# Patient Record
Sex: FEMALE | Race: WHITE | NOT HISPANIC OR LATINO | Employment: OTHER | URBAN - NONMETROPOLITAN AREA
[De-identification: names, ages, dates, MRNs, and addresses within clinical notes are randomized per-mention and may not be internally consistent; named-entity substitution may affect disease eponyms.]

---

## 2017-01-18 ENCOUNTER — OFFICE VISIT (OUTPATIENT)
Dept: CARDIOLOGY | Facility: CLINIC | Age: 82
End: 2017-01-18

## 2017-01-18 VITALS
HEART RATE: 76 BPM | SYSTOLIC BLOOD PRESSURE: 120 MMHG | BODY MASS INDEX: 29.55 KG/M2 | OXYGEN SATURATION: 92 % | WEIGHT: 177.38 LBS | HEIGHT: 65 IN | DIASTOLIC BLOOD PRESSURE: 60 MMHG

## 2017-01-18 DIAGNOSIS — R07.89 CHEST DISCOMFORT: ICD-10-CM

## 2017-01-18 DIAGNOSIS — I48.0 PAF (PAROXYSMAL ATRIAL FIBRILLATION) (HCC): Primary | ICD-10-CM

## 2017-01-18 DIAGNOSIS — I10 ESSENTIAL HYPERTENSION: ICD-10-CM

## 2017-01-18 DIAGNOSIS — E78.00 HYPERCHOLESTEROLEMIA: ICD-10-CM

## 2017-01-18 DIAGNOSIS — R06.02 SHORTNESS OF BREATH: ICD-10-CM

## 2017-01-18 DIAGNOSIS — I44.0 FIRST DEGREE AV BLOCK: ICD-10-CM

## 2017-01-18 DIAGNOSIS — Z79.899 LONG TERM CURRENT USE OF ANTIARRHYTHMIC MEDICAL THERAPY: ICD-10-CM

## 2017-01-18 DIAGNOSIS — R01.1 MURMUR, CARDIAC: ICD-10-CM

## 2017-01-18 DIAGNOSIS — R53.83 OTHER FATIGUE: ICD-10-CM

## 2017-01-18 DIAGNOSIS — Z79.01 CURRENT USE OF LONG TERM ANTICOAGULATION: ICD-10-CM

## 2017-01-18 PROCEDURE — 93000 ELECTROCARDIOGRAM COMPLETE: CPT | Performed by: NURSE PRACTITIONER

## 2017-01-18 PROCEDURE — 99214 OFFICE O/P EST MOD 30 MIN: CPT | Performed by: NURSE PRACTITIONER

## 2017-01-18 RX ORDER — LOSARTAN POTASSIUM 100 MG/1
100 TABLET ORAL DAILY
COMMUNITY
End: 2018-07-19

## 2017-01-18 RX ORDER — POTASSIUM CHLORIDE 20 MEQ/1
20 TABLET, EXTENDED RELEASE ORAL DAILY
COMMUNITY
End: 2018-11-19 | Stop reason: DRUGHIGH

## 2017-01-18 NOTE — MR AVS SNAPSHOT
Danay EMILIA Connolly   1/18/2017 1:00 PM   Office Visit    Dept Phone:  [phone]   Encounter #:  [id number]    Provider:  SANDRA Rabago   Department:  [facility] CARDIOLOGY                Your Full Care Plan              Today's Medication Changes          These changes are accurate as of: 1/18/17  1:47 PM.  If you have any questions, ask your nurse or doctor.               Medication(s)that have changed:     losartan 100 MG tablet   Commonly known as:  COZAAR   Take 100 mg by mouth Daily.   What changed:  Another medication with the same name was removed. Continue taking this medication, and follow the directions you see here.   Changed by:  SANDRA Rabago         Stop taking medication(s)listed here:     potassium chloride 10 MEQ CR tablet   Commonly known as:  K-DUR   Stopped by:  SANDRA Rabago                      Your Updated Medication List          This list is accurate as of: 1/18/17  1:47 PM.  Always use your most recent med list.                albuterol 108 (90 BASE) MCG/ACT inhaler   Commonly known as:  PROVENTIL HFA;VENTOLIN HFA       apixaban 5 MG tablet tablet   Commonly known as:  ELIQUIS       aspirin 81 MG EC tablet       CARTIA  MG 24 hr capsule   Generic drug:  diltiaZEM CD   TAKE 1 CAPSULE TWICE A DAY       cetirizine 10 MG tablet   Commonly known as:  zyrTEC       esomeprazole 40 MG capsule   Commonly known as:  nexIUM       fenofibrate 145 MG tablet   Commonly known as:  TRICOR       flecainide 50 MG tablet   Commonly known as:  TAMBOCOR       FLUoxetine 20 MG capsule   Commonly known as:  PROzac       furosemide 40 MG tablet   Commonly known as:  LASIX       losartan 100 MG tablet   Commonly known as:  COZAAR       meclizine 12.5 MG tablet   Commonly known as:  ANTIVERT       MULTI VITAMIN DAILY PO       OSTEO BI-FLEX ADV JOINT SHIELD tablet       polycarbophil 625 MG tablet   Commonly known as:  FIBERCON       potassium  "chloride 20 MEQ CR tablet   Commonly known as:  K-DUR,KLOR-CON       tiotropium 18 MCG per inhalation capsule   Commonly known as:  SPIRIVA       Vitamin B Complex tablet               We Performed the Following     ECG 12 Lead       You Were Diagnosed With        Codes Comments    PAF (paroxysmal atrial fibrillation)    -  Primary ICD-10-CM: I48.0  ICD-9-CM: 427.31     Essential hypertension     ICD-10-CM: I10  ICD-9-CM: 401.9     Hypercholesterolemia     ICD-10-CM: E78.00  ICD-9-CM: 272.0     First degree AV block     ICD-10-CM: I44.0  ICD-9-CM: 426.11     Chest discomfort     ICD-10-CM: R07.89  ICD-9-CM: 786.59     Shortness of breath     ICD-10-CM: R06.02  ICD-9-CM: 786.05     Other fatigue     ICD-10-CM: R53.83  ICD-9-CM: 780.79     Murmur, cardiac     ICD-10-CM: R01.1  ICD-9-CM: 785.2       Instructions     None    Patient Instructions History      Upcoming Appointments     Visit Type Date Time Department    FOLLOW UP 1/18/2017  1:00 PM [facility]    FOLLOW UP 7/24/2017 12:45 PM [facility]      MyChart Signup     Our records indicate that you have declined [facility] [employer] signup. If you would like to sign up for [employer], please email [email or web address] or call [phone] to obtain an activation code.             Other Info from Your Visit           Your Appointments     Jul 24, 2017 12:45 PM EDT   Follow Up with SANDRA Hudson   [facility] (--)    [address] [place] [address] [phone]           Arrive 15 minutes prior to appointment.              Allergies     No Known Allergies      Reason for Visit     Follow-up Rapid heart beat when \"feeling stressed\". PCP refills medications.      Vital Signs     Blood Pressure Pulse Height Weight Oxygen Saturation Body Mass Index    120/60 (BP Location: Left arm, Patient Position: Sitting, Cuff Size: Small Adult) 76 65\" (165.1 cm) 177 lb 6 oz (80.5 kg) 92% 29.52 kg/m2    Smoking " Status                   Never Smoker           Problems and Diagnoses Noted     First degree AV block    High cholesterol    High blood pressure    Atrial fibrillation (irregular heartbeat)    Chest discomfort        Shortness of breath        Malaise and fatigue        Murmur, cardiac

## 2017-01-18 NOTE — PROGRESS NOTES
"Chief Complaint   Patient presents with   • Follow-up     Rapid heart beat when \"feeling stressed\". PCP refills medications.       Subjective       Danay Connolly is a 82 y.o. female  with a history of palpitations and PAF. She has had a NERI and cardioversion for the PAF. She has been maintained on anticoagulation therapy and tambocor.  Today she returns to the office for a follow-up appointment.  Whenever feeling stress she does notice her heart develop palpitations in the form of racing.  She admits to increasing fatigue and shortness of breath with minimal exertion.  She states she stopped exercising and feels \"out of shape\".  At times she feels some chest discomfort but denies chest pain. Since stopping Nexium she mentions having more heart burn and takes Zantac as needed.     HPI         Cardiac History:    Past Surgical History   Procedure Laterality Date   • Breast cyst aspiration     • Mouth surgery     • Colonoscopy       wilth polypectomy   • Hysterectomy     • Echo - converted  08/27/1997     Echo- EF 50%. R/O Biscupid AV   • Converted (historical) holter  06/27/2011      Holter- (Dr. Garrett)  AVG HR 73 BPM. Mul Artifacts. Few PAC's   • Cardiovascular stress test  07/2011     Stress- 3 min, >100% THR. BP- 180/76. Negative   • Echo - converted  07/2011     Echo- EF 65%. Anterior WMA. RVSP- 53mmHg   • Converted (historical) holter  04/30/2014     Holter- (Dr. Garrett) A-Fib at AVG  BPM.   • Neri  05/14/2014     NERI and Cardioversion- No thrombus, converted to sinus with 200 J.       Current Outpatient Prescriptions   Medication Sig Dispense Refill   • apixaban (ELIQUIS) 5 MG tablet tablet Take 5 mg by mouth 2 (two) times a day.     • aspirin 81 MG EC tablet Take 81 mg by mouth daily.     • B Complex Vitamins (VITAMIN B COMPLEX) tablet Take 1 tablet by mouth daily.     • CARTIA  MG 24 hr capsule TAKE 1 CAPSULE TWICE A  capsule 1   • cetirizine (ZyrTEC) 10 MG tablet Take 10 mg by mouth daily. "     • fenofibrate (TRICOR) 145 MG tablet Take 145 mg by mouth every night.     • flecainide (TAMBOCOR) 50 MG tablet Take 50 mg by mouth every 12 (twelve) hours.     • FLUoxetine (PROzac) 20 MG capsule Take 20 mg by mouth every night.     • furosemide (LASIX) 40 MG tablet Take 20 mg by mouth every morning. (1/2 tab qam)     • losartan (COZAAR) 100 MG tablet Take 100 mg by mouth Daily.     • meclizine (ANTIVERT) 12.5 MG tablet Take 12.5 mg by mouth 3 (Three) Times a Day As Needed for dizziness (takes only when episode of dizziness occur).     • Misc Natural Products (OSTEO BI-FLEX ADV JOINT SHIELD) tablet Take 1 tablet by mouth daily.     • Multiple Vitamin (MULTI VITAMIN DAILY PO) Take 1 tablet by mouth daily.     • potassium chloride (K-DUR,KLOR-CON) 20 MEQ CR tablet Take 20 mEq by mouth Daily.     • tiotropium (SPIRIVA) 18 MCG per inhalation capsule Place 1 capsule into inhaler and inhale 1 (one) time daily.     • albuterol (PROVENTIL HFA;VENTOLIN HFA) 108 (90 BASE) MCG/ACT inhaler Inhale 2 puffs every 4 (four) hours as needed for wheezing.     • polycarbophil (FIBERCON) 625 MG tablet Take 625 mg by mouth daily.       No current facility-administered medications for this visit.        Review of patient's allergies indicates no known allergies.    Past Medical History   Diagnosis Date   • Arthritis    • Breast cyst      removed    • GERD (gastroesophageal reflux disease)    • H/O oral surgery    • H/O: hysterectomy    • Hx of colonoscopy with polypectomy    • Hypercholesterolemia    • Hyperlipidemia    • Hypertension    • Osteoporosis    • Seasonal allergies        Social History     Social History   • Marital status:      Spouse name: N/A   • Number of children: N/A   • Years of education: N/A     Occupational History   • Not on file.     Social History Main Topics   • Smoking status: Never Smoker   • Smokeless tobacco: Never Used   • Alcohol use No      Comment: Occasional    • Drug use: No   • Sexual  "activity: Not on file     Other Topics Concern   • Not on file     Social History Narrative       Family History   Problem Relation Age of Onset   • Stroke Mother    • Heart attack Father    • Hypertension Son        Review of Systems   Constitutional: Positive for fatigue. Negative for activity change, appetite change and fever.   HENT: Negative for congestion, nosebleeds, sinus pressure and trouble swallowing.    Respiratory: Positive for chest tightness and shortness of breath (worse with inclines and steps). Negative for choking and wheezing.    Cardiovascular: Positive for palpitations. Negative for chest pain and leg swelling.   Gastrointestinal: Negative for abdominal distention, abdominal pain, blood in stool, constipation, nausea (has had increase in heart burn) and vomiting.   Endocrine: Negative for polydipsia, polyphagia and polyuria.   Genitourinary: Negative for dysuria and hematuria.   Musculoskeletal: Negative for gait problem and myalgias.   Neurological: Positive for light-headedness. Negative for dizziness, syncope, speech difficulty, weakness and headaches.   Hematological: Bruises/bleeds easily.   Psychiatric/Behavioral: Negative for confusion and sleep disturbance. The patient is not nervous/anxious.      Diabetes- No  Thyroid-normal    Objective     Visit Vitals   • /60 (BP Location: Left arm, Patient Position: Sitting, Cuff Size: Small Adult)   • Pulse 76   • Ht 65\" (165.1 cm)   • Wt 177 lb 6 oz (80.5 kg)   • SpO2 92%   • BMI 29.52 kg/m2       Physical Exam   Constitutional: She is oriented to person, place, and time. Vital signs are normal. She appears well-developed.   Eyes: Pupils are equal, round, and reactive to light.   Neck: Neck supple. Carotid bruit is not present.   Cardiovascular: Normal rate, regular rhythm, S1 normal and normal pulses.    Murmur heard.   Systolic murmur is present with a grade of 2/6   Pulmonary/Chest: Effort normal and breath sounds normal.   Abdominal: " Soft. Bowel sounds are normal.   Musculoskeletal: She exhibits no edema.   Neurological: She is alert and oriented to person, place, and time.   Skin: Skin is warm and dry.   Psychiatric: She has a normal mood and affect. Her behavior is normal. Judgment and thought content normal.   Vitals reviewed.      ECG 12 Lead  Date/Time: 1/18/2017 1:29 PM  Performed by: GERHARD CANALES  Authorized by: GERHARD CANALES   Comparison: compared with previous ECG from 6/20/2016  Comparison to previous ECG: sinus  Rhythm: sinus rhythm  BPM: 75  Conduction: 1st degree  Comments:  ms, QTc 423 ms                Assessment/Plan      Danay was seen today for follow-up.    Diagnoses and all orders for this visit:    PAF (paroxysmal atrial fibrillation)  -     ECG 12 Lead  -     Stress Test With Myocardial Perfusion One Day; Future    Essential hypertension  -     Stress Test With Myocardial Perfusion One Day; Future  -     Adult Transthoracic Echo Complete; Future    Hypercholesterolemia  -     Stress Test With Myocardial Perfusion One Day; Future  -     Adult Transthoracic Echo Complete; Future    First degree AV block  -     Stress Test With Myocardial Perfusion One Day; Future  -     Adult Transthoracic Echo Complete; Future    Chest discomfort  -     Stress Test With Myocardial Perfusion One Day; Future  -     Adult Transthoracic Echo Complete; Future    Shortness of breath  -     Stress Test With Myocardial Perfusion One Day; Future  -     Adult Transthoracic Echo Complete; Future    Other fatigue  -     Stress Test With Myocardial Perfusion One Day; Future  -     Adult Transthoracic Echo Complete; Future    Murmur, cardiac  -     Stress Test With Myocardial Perfusion One Day; Future  -     Adult Transthoracic Echo Complete; Future    Long term current use of antiarrhythmic medical therapy  -     ECG 12 Lead    Current use of long term anticoagulation      It has been over 5 years since Ms. Connolly underwent cardiac workup.   Her symptoms could be anginal equivalents therefore a nuclear stress test and echocardiogram ordered.  She feels she is able to walk the treadmill but we will order as modified Bryce.  No medication changes recommended at this time. I advised her to take Zantac on regular basis for a couple of weeks to see if has improvement of heart burn symptoms.  Her EKG today remains sinus with first-degree AV block.  QTc interval is in normal range.  For management of labs she will follow with you.  It is noted her labs drawn about 6 months ago were stable.  Further recommendations based on cardiac workup.             Electronically signed by SANDRA Wilder,  January 18, 2017 2:46 PM

## 2017-01-18 NOTE — LETTER
"January 18, 2017     Bimal Garrett MD  [address]  [place] [address]    Patient: Danay Connolly   YOB: 1934   Date of Visit: 1/18/2017       Dear Dr. Tami MD:    Danay Connolly was in my office today. Below is a copy of my note.    If you have questions, please do not hesitate to call me. I look forward to following Danay along with you.         Sincerely,        Wanda Felix, SANDRA        CC: No Recipients    Chief Complaint   Patient presents with   • Follow-up     Rapid heart beat when \"feeling stressed\". PCP refills medications.       Subjective       Danay Connolly is a 82 y.o. female  with a history of palpitations and PAF. She has had a NERI and cardioversion for the PAF. She has been maintained on anticoagulation therapy and tambocor.  Today she returns to the office for a follow-up appointment.  Whenever feeling stress she does notice her heart develop palpitations in the form of racing.  She admits to increasing fatigue and shortness of breath with minimal exertion.  She states she stopped exercising and feels \"out of shape\".  At times she feels some chest discomfort but denies chest pain. Since stopping Nexium she mentions having more heart burn and takes Zantac as needed.     HPI         Cardiac History:    Past Surgical History   Procedure Laterality Date   • Breast cyst aspiration     • Mouth surgery     • Colonoscopy       wilth polypectomy   • Hysterectomy     • Echo - converted  08/27/1997     Echo- EF 50%. R/O Biscupid AV   • Converted (historical) holter  06/27/2011      Holter- (Dr. Garrett)  AVG HR 73 BPM. Mul Artifacts. Few PAC's   • Cardiovascular stress test  07/2011     Stress- 3 min, >100% THR. BP- 180/76. Negative   • Echo - converted  07/2011     Echo- EF 65%. Anterior WMA. RVSP- 53mmHg   • Converted (historical) holter  04/30/2014     Holter- (Dr. Garrett) A-Fib at AVG  BPM.   • Neri  05/14/2014     NERI and Cardioversion- No thrombus, converted to " sinus with 200 J.       Current Outpatient Prescriptions   Medication Sig Dispense Refill   • apixaban (ELIQUIS) 5 MG tablet tablet Take 5 mg by mouth 2 (two) times a day.     • aspirin 81 MG EC tablet Take 81 mg by mouth daily.     • B Complex Vitamins (VITAMIN B COMPLEX) tablet Take 1 tablet by mouth daily.     • CARTIA  MG 24 hr capsule TAKE 1 CAPSULE TWICE A  capsule 1   • cetirizine (ZyrTEC) 10 MG tablet Take 10 mg by mouth daily.     • fenofibrate (TRICOR) 145 MG tablet Take 145 mg by mouth every night.     • flecainide (TAMBOCOR) 50 MG tablet Take 50 mg by mouth every 12 (twelve) hours.     • FLUoxetine (PROzac) 20 MG capsule Take 20 mg by mouth every night.     • furosemide (LASIX) 40 MG tablet Take 20 mg by mouth every morning. (1/2 tab qam)     • losartan (COZAAR) 100 MG tablet Take 100 mg by mouth Daily.     • meclizine (ANTIVERT) 12.5 MG tablet Take 12.5 mg by mouth 3 (Three) Times a Day As Needed for dizziness (takes only when episode of dizziness occur).     • Misc Natural Products (OSTEO BI-FLEX ADV JOINT SHIELD) tablet Take 1 tablet by mouth daily.     • Multiple Vitamin (MULTI VITAMIN DAILY PO) Take 1 tablet by mouth daily.     • potassium chloride (K-DUR,KLOR-CON) 20 MEQ CR tablet Take 20 mEq by mouth Daily.     • tiotropium (SPIRIVA) 18 MCG per inhalation capsule Place 1 capsule into inhaler and inhale 1 (one) time daily.     • albuterol (PROVENTIL HFA;VENTOLIN HFA) 108 (90 BASE) MCG/ACT inhaler Inhale 2 puffs every 4 (four) hours as needed for wheezing.     • polycarbophil (FIBERCON) 625 MG tablet Take 625 mg by mouth daily.       No current facility-administered medications for this visit.        Review of patient's allergies indicates no known allergies.    Past Medical History   Diagnosis Date   • Arthritis    • Breast cyst      removed    • GERD (gastroesophageal reflux disease)    • H/O oral surgery    • H/O: hysterectomy    • Hx of colonoscopy with polypectomy    •  "Hypercholesterolemia    • Hyperlipidemia    • Hypertension    • Osteoporosis    • Seasonal allergies        Social History     Social History   • Marital status:      Spouse name: N/A   • Number of children: N/A   • Years of education: N/A     Occupational History   • Not on file.     Social History Main Topics   • Smoking status: Never Smoker   • Smokeless tobacco: Never Used   • Alcohol use No      Comment: Occasional    • Drug use: No   • Sexual activity: Not on file     Other Topics Concern   • Not on file     Social History Narrative       Family History   Problem Relation Age of Onset   • Stroke Mother    • Heart attack Father    • Hypertension Son        Review of Systems   Constitutional: Positive for fatigue. Negative for activity change, appetite change and fever.   HENT: Negative for congestion, nosebleeds, sinus pressure and trouble swallowing.    Respiratory: Positive for chest tightness and shortness of breath (worse with inclines and steps). Negative for choking and wheezing.    Cardiovascular: Positive for palpitations. Negative for chest pain and leg swelling.   Gastrointestinal: Negative for abdominal distention, abdominal pain, blood in stool, constipation, nausea (has had increase in heart burn) and vomiting.   Endocrine: Negative for polydipsia, polyphagia and polyuria.   Genitourinary: Negative for dysuria and hematuria.   Musculoskeletal: Negative for gait problem and myalgias.   Neurological: Positive for light-headedness. Negative for dizziness, syncope, speech difficulty, weakness and headaches.   Hematological: Bruises/bleeds easily.   Psychiatric/Behavioral: Negative for confusion and sleep disturbance. The patient is not nervous/anxious.      Diabetes- No  Thyroid-normal    Objective     Visit Vitals   • /60 (BP Location: Left arm, Patient Position: Sitting, Cuff Size: Small Adult)   • Pulse 76   • Ht 65\" (165.1 cm)   • Wt 177 lb 6 oz (80.5 kg)   • SpO2 92%   • BMI 29.52 " kg/m2       Physical Exam   Constitutional: She is oriented to person, place, and time. Vital signs are normal. She appears well-developed.   Eyes: Pupils are equal, round, and reactive to light.   Neck: Neck supple. Carotid bruit is not present.   Cardiovascular: Normal rate, regular rhythm, S1 normal and normal pulses.    Murmur heard.   Systolic murmur is present with a grade of 2/6   Pulmonary/Chest: Effort normal and breath sounds normal.   Abdominal: Soft. Bowel sounds are normal.   Musculoskeletal: She exhibits no edema.   Neurological: She is alert and oriented to person, place, and time.   Skin: Skin is warm and dry.   Psychiatric: She has a normal mood and affect. Her behavior is normal. Judgment and thought content normal.   Vitals reviewed.      ECG 12 Lead  Date/Time: 1/18/2017 1:29 PM  Performed by: GERHARD CANALES  Authorized by: GERHARD CANALES   Comparison: compared with previous ECG from 6/20/2016  Comparison to previous ECG: sinus  Rhythm: sinus rhythm  BPM: 75  Conduction: 1st degree  Comments:  ms, QTc 423 ms                Assessment/Plan      Danay was seen today for follow-up.    Diagnoses and all orders for this visit:    PAF (paroxysmal atrial fibrillation)  -     ECG 12 Lead  -     Stress Test With Myocardial Perfusion One Day; Future    Essential hypertension  -     Stress Test With Myocardial Perfusion One Day; Future  -     Adult Transthoracic Echo Complete; Future    Hypercholesterolemia  -     Stress Test With Myocardial Perfusion One Day; Future  -     Adult Transthoracic Echo Complete; Future    First degree AV block  -     Stress Test With Myocardial Perfusion One Day; Future  -     Adult Transthoracic Echo Complete; Future    Chest discomfort  -     Stress Test With Myocardial Perfusion One Day; Future  -     Adult Transthoracic Echo Complete; Future    Shortness of breath  -     Stress Test With Myocardial Perfusion One Day; Future  -     Adult Transthoracic Echo Complete;  Future    Other fatigue  -     Stress Test With Myocardial Perfusion One Day; Future  -     Adult Transthoracic Echo Complete; Future    Murmur, cardiac  -     Stress Test With Myocardial Perfusion One Day; Future  -     Adult Transthoracic Echo Complete; Future    Long term current use of antiarrhythmic medical therapy  -     ECG 12 Lead    Current use of long term anticoagulation      It has been over 5 years since Ms. Connolly underwent cardiac workup.  Her symptoms could be anginal equivalents therefore a nuclear stress test and echocardiogram ordered.  She feels she is able to walk the treadmill but we will order as modified Bryce.  No medication changes recommended at this time. I advised her to take Zantac on regular basis for a couple of weeks to see if has improvement of heart burn symptoms.  Her EKG today remains sinus with first-degree AV block.  QTc interval is in normal range.  For management of labs she will follow with you.  It is noted her labs drawn about 6 months ago were stable.  Further recommendations based on cardiac workup.             Electronically signed by SANDRA Wilder,  January 18, 2017 2:46 PM

## 2017-02-06 ENCOUNTER — OUTSIDE FACILITY SERVICE (OUTPATIENT)
Dept: CARDIOLOGY | Facility: CLINIC | Age: 82
End: 2017-02-06

## 2017-02-06 ENCOUNTER — HOSPITAL ENCOUNTER (OUTPATIENT)
Dept: CARDIOLOGY | Facility: HOSPITAL | Age: 82
Discharge: HOME | End: 2017-02-06

## 2017-02-06 LAB
MAXIMAL PREDICTED HEART RATE: 138 BPM
STRESS TARGET HR: 117 BPM

## 2017-02-06 PROCEDURE — 78452 HT MUSCLE IMAGE SPECT MULT: CPT

## 2017-02-06 PROCEDURE — A9500 TC99M SESTAMIBI: HCPCS | Performed by: INTERNAL MEDICINE

## 2017-02-06 PROCEDURE — 93306 TTE W/DOPPLER COMPLETE: CPT

## 2017-02-06 PROCEDURE — 93018 CV STRESS TEST I&R ONLY: CPT | Performed by: INTERNAL MEDICINE

## 2017-02-06 PROCEDURE — 93017 CV STRESS TEST TRACING ONLY: CPT

## 2017-02-06 PROCEDURE — 78452 HT MUSCLE IMAGE SPECT MULT: CPT | Performed by: INTERNAL MEDICINE

## 2017-02-06 PROCEDURE — 93306 TTE W/DOPPLER COMPLETE: CPT | Performed by: INTERNAL MEDICINE

## 2017-02-06 PROCEDURE — 0 TECHNETIUM SESTAMIBI: Performed by: INTERNAL MEDICINE

## 2017-02-06 PROCEDURE — 25010000002 REGADENOSON 0.4 MG/5ML SOLUTION: Performed by: INTERNAL MEDICINE

## 2017-02-06 RX ADMIN — REGADENOSON 0.4 MG: 0.08 INJECTION, SOLUTION INTRAVENOUS at 11:15

## 2017-02-06 RX ADMIN — Medication 1 DOSE: at 11:15

## 2017-02-07 ENCOUNTER — TELEPHONE (OUTPATIENT)
Dept: CARDIOLOGY | Facility: CLINIC | Age: 82
End: 2017-02-07

## 2017-02-07 NOTE — TELEPHONE ENCOUNTER
Patient made aware of stress results and recommendations to continue same home medications, patient verbalized understanding.

## 2017-05-30 RX ORDER — DILTIAZEM HYDROCHLORIDE 240 MG/1
CAPSULE, EXTENDED RELEASE ORAL
Qty: 180 CAPSULE | Refills: 3 | Status: SHIPPED | OUTPATIENT
Start: 2017-05-30 | End: 2018-01-24

## 2017-07-24 ENCOUNTER — OFFICE VISIT (OUTPATIENT)
Dept: CARDIOLOGY | Facility: CLINIC | Age: 82
End: 2017-07-24

## 2017-07-24 VITALS
WEIGHT: 174 LBS | BODY MASS INDEX: 28.99 KG/M2 | HEIGHT: 65 IN | DIASTOLIC BLOOD PRESSURE: 74 MMHG | HEART RATE: 80 BPM | SYSTOLIC BLOOD PRESSURE: 120 MMHG

## 2017-07-24 DIAGNOSIS — R53.83 OTHER FATIGUE: ICD-10-CM

## 2017-07-24 DIAGNOSIS — R01.1 MURMUR, CARDIAC: ICD-10-CM

## 2017-07-24 DIAGNOSIS — E78.00 HYPERCHOLESTEROLEMIA: Primary | ICD-10-CM

## 2017-07-24 DIAGNOSIS — I48.0 PAF (PAROXYSMAL ATRIAL FIBRILLATION) (HCC): ICD-10-CM

## 2017-07-24 DIAGNOSIS — R40.0 DAYTIME SLEEPINESS: ICD-10-CM

## 2017-07-24 DIAGNOSIS — I44.0 FIRST DEGREE AV BLOCK: ICD-10-CM

## 2017-07-24 DIAGNOSIS — K21.9 GASTROESOPHAGEAL REFLUX DISEASE WITHOUT ESOPHAGITIS: ICD-10-CM

## 2017-07-24 DIAGNOSIS — I10 ESSENTIAL HYPERTENSION: ICD-10-CM

## 2017-07-24 DIAGNOSIS — R06.02 SHORTNESS OF BREATH: ICD-10-CM

## 2017-07-24 PROCEDURE — 99213 OFFICE O/P EST LOW 20 MIN: CPT | Performed by: NURSE PRACTITIONER

## 2017-07-24 PROCEDURE — 93000 ELECTROCARDIOGRAM COMPLETE: CPT | Performed by: NURSE PRACTITIONER

## 2017-07-24 NOTE — PROGRESS NOTES
Chief Complaint   Patient presents with   • Follow-up     6 month follow-up, labs per Dr. Garrett about 4 months ago, patient brought medication list with visit. reports refill's per Dr. Garrett   • Shortness of Breath     reports increase in shortness of breath along with fatique       Cardiac Complaints  dyspnea      Subjective   Danay Connolly is a 83 y.o. female with a history of palpitations and PAF. She has had a COCO and cardioversion for the PAF. She has since been maintained on anticoagulation therapy and tambocor.  At last visit, she reported more fatigue, shortness of breath with exertion and increase in palpitations.  Since anginal equivalents were reported at last visit, cardiac workup with stress and echo advised.  Stress test showed no ischemic burden and good LV function, echo showed that her RSVP has improved to 40-45 mmHg.  She returns today for follow up and denies any new concerns.  She does report continued shortness of breath and states most often it is the same but states that it seems worse with her hip since she has had bursitis and is harder for her to move.  She also continues to have fatigue and does report some daytime drowsiness.         Cardiac History  Past Surgical History:   Procedure Laterality Date   • BREAST CYST ASPIRATION     • CARDIOVASCULAR STRESS TEST  07/2011    Stress- 3 min, >100% THR. BP- 180/76. Negative   • CARDIOVASCULAR STRESS TEST  02/06/2017    EF 65%, no ischemia   • COLONOSCOPY      wilth polypectomy   • CONVERTED (HISTORICAL) HOLTER  06/27/2011     Holter- (Dr. Garrett)  AVG HR 73 BPM. Mul Artifacts. Few PAC's   • CONVERTED (HISTORICAL) HOLTER  04/30/2014    Holter- (Dr. Garrett) A-Fib at AVG  BPM.   • ECHO - CONVERTED  08/27/1997    Echo- EF 50%. R/O Biscupid AV   • ECHO - CONVERTED  07/2011    Echo- EF 65%. Anterior WMA. RVSP- 53mmHg   • ECHO - CONVERTED  02/06/2017    EF 65%, no AS, mild MR, RSVP 40 mmHg   • HYSTERECTOMY     • MOUTH SURGERY     • COCO   05/14/2014    COCO and Cardioversion- No thrombus, converted to sinus with 200 J.       Current Outpatient Prescriptions   Medication Sig Dispense Refill   • albuterol (PROVENTIL HFA;VENTOLIN HFA) 108 (90 BASE) MCG/ACT inhaler Inhale 2 puffs every 4 (four) hours as needed for wheezing.     • apixaban (ELIQUIS) 5 MG tablet tablet Take 5 mg by mouth 2 (two) times a day.     • aspirin 81 MG EC tablet Take 81 mg by mouth daily.     • B Complex Vitamins (VITAMIN B COMPLEX) tablet Take 1 tablet by mouth daily.     • CARTIA  MG 24 hr capsule TAKE 1 CAPSULE TWICE A  capsule 3   • cetirizine (ZyrTEC) 10 MG tablet Take 10 mg by mouth daily.     • fenofibrate (TRICOR) 145 MG tablet Take 145 mg by mouth every night.     • flecainide (TAMBOCOR) 50 MG tablet Take 50 mg by mouth every 12 (twelve) hours.     • FLUoxetine (PROzac) 20 MG capsule Take 20 mg by mouth every night.     • furosemide (LASIX) 40 MG tablet Take 20 mg by mouth every morning. (1/2 tab qam)     • losartan (COZAAR) 100 MG tablet Take 100 mg by mouth Daily.     • meclizine (ANTIVERT) 12.5 MG tablet Take 12.5 mg by mouth 3 (Three) Times a Day As Needed for dizziness (takes only when episode of dizziness occur).     • Misc Natural Products (OSTEO BI-FLEX ADV JOINT SHIELD) tablet Take 1 tablet by mouth daily.     • Multiple Vitamin (MULTI VITAMIN DAILY PO) Take 1 tablet by mouth daily.     • polycarbophil (FIBERCON) 625 MG tablet Take 625 mg by mouth daily.     • potassium chloride (K-DUR,KLOR-CON) 20 MEQ CR tablet Take 20 mEq by mouth Daily.     • tiotropium (SPIRIVA) 18 MCG per inhalation capsule Place 1 capsule into inhaler and inhale 1 (one) time daily.       No current facility-administered medications for this visit.        Review of patient's allergies indicates no known allergies.    Past Medical History:   Diagnosis Date   • Arthritis    • Breast cyst     removed    • GERD (gastroesophageal reflux disease)    • H/O oral surgery    • H/O:  "hysterectomy    • Hx of colonoscopy with polypectomy    • Hypercholesterolemia    • Hyperlipidemia    • Hypertension    • Osteoporosis    • Seasonal allergies        Social History     Social History   • Marital status:      Spouse name: N/A   • Number of children: N/A   • Years of education: N/A     Occupational History   • Not on file.     Social History Main Topics   • Smoking status: Never Smoker   • Smokeless tobacco: Never Used   • Alcohol use No      Comment: Occasional    • Drug use: No   • Sexual activity: Not on file     Other Topics Concern   • Not on file     Social History Narrative       Family History   Problem Relation Age of Onset   • Stroke Mother    • Heart attack Father    • Hypertension Son        Review of Systems   Constitution: Positive for malaise/fatigue. Negative for weakness.   Cardiovascular: Positive for dyspnea on exertion. Negative for chest pain, near-syncope, orthopnea and palpitations.   Respiratory: Positive for shortness of breath. Negative for cough.    Musculoskeletal: Negative for arthritis and back pain.   Gastrointestinal: Negative for anorexia, heartburn and nausea.   Genitourinary: Negative for dysuria, hematuria and nocturia.   Neurological: Positive for excessive daytime sleepiness. Negative for dizziness, light-headedness and loss of balance.   Psychiatric/Behavioral: Negative for altered mental status and depression.       DiabetesNo  Thyroidnormal    Objective     /74 (BP Location: Right arm)  Pulse 80  Ht 65\" (165.1 cm)  Wt 174 lb (78.9 kg)  BMI 28.96 kg/m2    Physical Exam   Constitutional: She is oriented to person, place, and time. She appears well-developed and well-nourished.   HENT:   Head: Normocephalic and atraumatic.   Eyes: EOM are normal. Pupils are equal, round, and reactive to light.   Neck: Normal range of motion. Neck supple.   Cardiovascular: Normal rate and regular rhythm.    Murmur heard.  Pulmonary/Chest: Effort normal and breath " sounds normal.   Abdominal: Soft.   Musculoskeletal: Normal range of motion.   Neurological: She is alert and oriented to person, place, and time.   Skin: Skin is warm and dry.   Psychiatric: She has a normal mood and affect. Her behavior is normal.         ECG 12 Lead  Date/Time: 7/24/2017 1:24 PM  Performed by: JOANN FELIZ  Authorized by: JOANN FELIZ   Rhythm: sinus rhythm  BPM: 77  Conduction: 1st degree  Clinical impression: abnormal ECG  Comments: Normal QT            Assessment/Plan     HR and BP are stable today.  No changes in meds will be made.  EKG done today for PAF and medication therapy shows NSR with first degree block and normal QT,  And first degree block has actually improved to a WA interval of 264. She will continue on current eliquis dosing as any signs of bleeding are denied. She does continue to have some shortness of breath and does also report some daytime fatigue, we will advise on overnight oximetry to rule out any nocturnal desaturation. More recommendations to follow. Most recent cardiac workup showed no ischemic burden and improved RSVP to 40, findings discussed with patient.  No new cardiac workup will be advised.   Labs are done with you, not sure if TSH or vitamin B12 checked on last draw.  With next labs, could we please get a copy?  No refills are needed at present.  Good cardiac diet and activity as tolerated advised.  6 month follow up advised or sooner if needed.      Problems Addressed this Visit        Cardiovascular and Mediastinum    Hypercholesterolemia - Primary    PAF (paroxysmal atrial fibrillation)    Relevant Orders    ECG 12 Lead    Hypertension    First degree AV block    Murmur, cardiac       Digestive    GERD (gastroesophageal reflux disease)      Other Visit Diagnoses     Other fatigue        Relevant Orders    Overnight Sleep Oximetry Study    Shortness of breath        Relevant Orders    Overnight Sleep Oximetry Study    Daytime sleepiness         Relevant Orders    Overnight Sleep Oximetry Study                  Electronically signed by SANDRA Turner July 24, 2017 3:05 PM

## 2018-01-24 ENCOUNTER — OFFICE VISIT (OUTPATIENT)
Dept: CARDIOLOGY | Facility: CLINIC | Age: 83
End: 2018-01-24

## 2018-01-24 VITALS
WEIGHT: 172.25 LBS | HEART RATE: 100 BPM | OXYGEN SATURATION: 98 % | DIASTOLIC BLOOD PRESSURE: 80 MMHG | BODY MASS INDEX: 28.7 KG/M2 | SYSTOLIC BLOOD PRESSURE: 134 MMHG | HEIGHT: 65 IN

## 2018-01-24 DIAGNOSIS — I49.3 PVC (PREMATURE VENTRICULAR CONTRACTION): ICD-10-CM

## 2018-01-24 DIAGNOSIS — R00.2 PALPITATIONS: ICD-10-CM

## 2018-01-24 DIAGNOSIS — E78.00 HYPERCHOLESTEROLEMIA: ICD-10-CM

## 2018-01-24 DIAGNOSIS — R00.0 SINUS TACHYCARDIA: ICD-10-CM

## 2018-01-24 DIAGNOSIS — Z79.01 CURRENT USE OF LONG TERM ANTICOAGULATION: ICD-10-CM

## 2018-01-24 DIAGNOSIS — Z79.899 LONG TERM CURRENT USE OF ANTIARRHYTHMIC DRUG: ICD-10-CM

## 2018-01-24 DIAGNOSIS — I48.0 PAF (PAROXYSMAL ATRIAL FIBRILLATION) (HCC): Primary | ICD-10-CM

## 2018-01-24 DIAGNOSIS — I10 ESSENTIAL HYPERTENSION: ICD-10-CM

## 2018-01-24 PROCEDURE — 93000 ELECTROCARDIOGRAM COMPLETE: CPT | Performed by: NURSE PRACTITIONER

## 2018-01-24 PROCEDURE — 99214 OFFICE O/P EST MOD 30 MIN: CPT | Performed by: NURSE PRACTITIONER

## 2018-01-24 RX ORDER — VIT C/B6/B5/MAGNESIUM/HERB 173 50-5-6-5MG
1 CAPSULE ORAL 2 TIMES DAILY
COMMUNITY
End: 2018-04-25 | Stop reason: ALTCHOICE

## 2018-01-24 RX ORDER — DILTIAZEM HYDROCHLORIDE 300 MG/1
300 CAPSULE, COATED, EXTENDED RELEASE ORAL DAILY
Qty: 30 CAPSULE | Refills: 1 | Status: SHIPPED | OUTPATIENT
Start: 2018-01-24 | End: 2018-02-15 | Stop reason: SDUPTHER

## 2018-01-24 NOTE — PROGRESS NOTES
Chief Complaint   Patient presents with   • Follow-up     cardiac management. Denies chest pain but says her heart beats fast at times. Labs done by PCP. I will call for results   • Shortness of Breath     Shortness of breath upon exertion   • Med Refill     PCP refills medicaitons. Patient brought medication list       Subjective       Danay Connolly is a 83 y.o. female with a history of palpitations and PAF. She has had a COCO and cardioversion for the PAF. She has since been maintained on anticoagulation therapy and tambocor.  In 2017,  she reported more fatigue, shortness of breath with exertion and increase in palpitations. Stress test showed no ischemic burden and good LV function, echo showed that her RSVP had improved to 40-45 mmHg.    At her last office visit she reported more fatigue. An overnight oxygen monitor was ordered. Report not available.   Today she comes to the office for a follow up visit. She admits to fatigue, episodes of increased heart rate and shortness of breath with exertion. She denies chest pain.     HPI         Cardiac History:    Past Surgical History:   Procedure Laterality Date   • BREAST CYST ASPIRATION     • CARDIOVASCULAR STRESS TEST  07/2011    Stress- 3 min, >100% THR. BP- 180/76. Negative   • CARDIOVASCULAR STRESS TEST  02/06/2017    EF 65%, no ischemia   • COLONOSCOPY      wilth polypectomy   • CONVERTED (HISTORICAL) HOLTER  06/27/2011     Holter- (Dr. Garrett)  AVG HR 73 BPM. Mul Artifacts. Few PAC's   • CONVERTED (HISTORICAL) HOLTER  04/30/2014    Holter- (Dr. Garrett) A-Fib at AVG  BPM.   • ECHO - CONVERTED  08/27/1997    Echo- EF 50%. R/O Biscupid AV   • ECHO - CONVERTED  07/2011    Echo- EF 65%. Anterior WMA. RVSP- 53mmHg   • ECHO - CONVERTED  02/06/2017    EF 65%, no AS, mild MR, RSVP 40 mmHg   • HYSTERECTOMY     • MOUTH SURGERY     • COCO  05/14/2014    COCO and Cardioversion- No thrombus, converted to sinus with 200 J.       Current Outpatient Prescriptions    Medication Sig Dispense Refill   • albuterol (PROVENTIL HFA;VENTOLIN HFA) 108 (90 BASE) MCG/ACT inhaler Inhale 2 puffs every 4 (four) hours as needed for wheezing.     • apixaban (ELIQUIS) 5 MG tablet tablet Take 5 mg by mouth 2 (two) times a day.     • aspirin 81 MG EC tablet Take 81 mg by mouth daily.     • B Complex Vitamins (VITAMIN B COMPLEX) tablet Take 1 tablet by mouth daily.     • cetirizine (ZyrTEC) 10 MG tablet Take 10 mg by mouth daily.     • fenofibrate (TRICOR) 145 MG tablet Take 145 mg by mouth every night.     • flecainide (TAMBOCOR) 50 MG tablet Take 50 mg by mouth every 12 (twelve) hours.     • FLUoxetine (PROzac) 20 MG capsule Take 20 mg by mouth every night.     • furosemide (LASIX) 40 MG tablet Take 20 mg by mouth every morning. (1/2 tab qam)     • losartan (COZAAR) 100 MG tablet Take 100 mg by mouth Daily. Decrease to 1/2 tablet daily     • meclizine (ANTIVERT) 12.5 MG tablet Take 12.5 mg by mouth 3 (Three) Times a Day As Needed for dizziness (takes only when episode of dizziness occur).     • Misc Natural Products (OSTEO BI-FLEX ADV JOINT SHIELD) tablet Take 1 tablet by mouth daily.     • Multiple Vitamin (MULTI VITAMIN DAILY PO) Take 1 tablet by mouth daily.     • potassium chloride (K-DUR,KLOR-CON) 20 MEQ CR tablet Take 20 mEq by mouth Daily.     • tiotropium (SPIRIVA) 18 MCG per inhalation capsule Place 1 capsule into inhaler and inhale 1 (one) time daily.     • Turmeric 500 MG capsule Take 1 tablet by mouth 2 (Two) Times a Day.     • diltiaZEM CD (CARDIZEM CD) 300 MG 24 hr capsule Take 1 capsule by mouth Daily. 30 capsule 1     No current facility-administered medications for this visit.        Review of patient's allergies indicates no known allergies.    Past Medical History:   Diagnosis Date   • Arthritis    • Breast cyst     removed    • GERD (gastroesophageal reflux disease)    • H/O oral surgery    • H/O: hysterectomy    • Hx of colonoscopy with polypectomy    •  "Hypercholesterolemia    • Hyperlipidemia    • Hypertension    • Osteoporosis    • Seasonal allergies        Social History     Social History   • Marital status:      Spouse name: N/A   • Number of children: N/A   • Years of education: N/A     Occupational History   • Not on file.     Social History Main Topics   • Smoking status: Never Smoker   • Smokeless tobacco: Never Used   • Alcohol use No      Comment: Occasional    • Drug use: No   • Sexual activity: Not on file     Other Topics Concern   • Not on file     Social History Narrative       Family History   Problem Relation Age of Onset   • Stroke Mother    • Heart attack Father    • Hypertension Son        Review of Systems   Constitution: Positive for malaise/fatigue. Negative for decreased appetite and fever.   HENT: Negative for congestion and nosebleeds.    Eyes: Negative for blurred vision and visual disturbance.   Cardiovascular: Positive for palpitations. Negative for chest pain, leg swelling and near-syncope.   Respiratory: Positive for shortness of breath. Negative for cough and sleep disturbances due to breathing.    Hematologic/Lymphatic: Negative for bleeding problem. Bruises/bleeds easily.   Skin: Negative for itching and rash.   Musculoskeletal: Negative for falls, joint pain and muscle weakness.   Gastrointestinal: Negative for abdominal pain, change in bowel habit, dysphagia, melena and nausea.   Genitourinary: Negative for dysuria and hematuria.   Neurological: Negative for dizziness, headaches, loss of balance, numbness and tremors.   Psychiatric/Behavioral: Negative for altered mental status. The patient does not have insomnia and is not nervous/anxious.    Allergic/Immunologic: Negative for hives.        Diabetes- No  Thyroid-normal    Objective     /80 (BP Location: Left arm, Patient Position: Sitting, Cuff Size: Adult)  Pulse 100  Ht 165.1 cm (65\")  Wt 78.1 kg (172 lb 4 oz)  SpO2 98%  BMI 28.66 kg/m2    Physical Exam "   Constitutional: She is oriented to person, place, and time. She appears well-nourished.   HENT:   Head: Normocephalic.   Eyes: Conjunctivae are normal. Pupils are equal, round, and reactive to light.   Neck: Normal range of motion. Neck supple. Carotid bruit is not present.   Cardiovascular: Regular rhythm, S1 normal, S2 normal and normal pulses.  Tachycardia present.    No murmur heard.  Pulmonary/Chest: Effort normal and breath sounds normal. No respiratory distress. She has no wheezes. She has no rales.   Abdominal: Soft. Bowel sounds are normal. She exhibits no distension. There is no tenderness.   Musculoskeletal: Normal range of motion. She exhibits no edema or tenderness.   Neurological: She is alert and oriented to person, place, and time.   Skin: Skin is warm and dry. No pallor.   Psychiatric: She has a normal mood and affect. Her behavior is normal.            ECG 12 Lead  Date/Time: 1/24/2018 12:36 PM  Performed by: GERHARD CANALES  Authorized by: GERHARD CANALES   Comparison: compared with previous ECG from 7/24/2017  Comparison to previous ECG: Sinus, first degree AV block  Rhythm: sinus tachycardia  BPM: 100  QRS axis: right  Comments:  ms, QTc 446 ms.                   Assessment/Plan      Danay was seen today for follow-up, shortness of breath and med refill.    Diagnoses and all orders for this visit:    PAF (paroxysmal atrial fibrillation)  -     ECG 12 Lead  -     Holter Monitor - 24 Hour; Future    Essential hypertension    Hypercholesterolemia    Current use of long term anticoagulation    PVC (premature ventricular contraction)  -     ECG 12 Lead  -     Holter Monitor - 24 Hour; Future    Long term current use of antiarrhythmic drug  -     ECG 12 Lead    Palpitations  -     ECG 12 Lead  -     Holter Monitor - 24 Hour; Future    Sinus tachycardia  -     ECG 12 Lead  -     Holter Monitor - 24 Hour; Future    Other orders  -     diltiaZEM CD (CARDIZEM CD) 300 MG 24 hr capsule; Take 1  capsule by mouth Daily.      Patient admits to more increased heart rate and shortness of breath. EKG today shows sinus tach with heart rate 100 bpm and 1 PVC noted. Her blood pressure is normal. Same dose Flecainide recommended. We will try increasing Cartia and decrease Cozaar. A 24 hour Holter monitor ordered. She remains on Eliquis for YULIA VASc score of 4,  without signs of bleeding. She denies use of any OTC cold medication.  Patient reports having recent labs and I have reqeusted a copy of results from your office.  Patient also reports having overnight oxygen study done, but I do not have a copy of results. I have requested copy of report if available, If not, consider repeat order for study to be done.   I encouraged her on good cardiac diet and avoiding caffeine. She continues fenofibrate for lipid management.   We will plan to see her back in 6 months. Further recommendations based on Holter report.               Electronically signed by SANDRA Wilder,  January 24, 2018 6:48 PM

## 2018-02-12 ENCOUNTER — OUTSIDE FACILITY SERVICE (OUTPATIENT)
Dept: CARDIOLOGY | Facility: CLINIC | Age: 83
End: 2018-02-12

## 2018-02-12 PROCEDURE — 93227 XTRNL ECG REC<48 HR R&I: CPT | Performed by: INTERNAL MEDICINE

## 2018-02-15 ENCOUNTER — TELEPHONE (OUTPATIENT)
Dept: CARDIOLOGY | Facility: CLINIC | Age: 83
End: 2018-02-15

## 2018-02-15 RX ORDER — DILTIAZEM HYDROCHLORIDE 300 MG/1
300 CAPSULE, COATED, EXTENDED RELEASE ORAL DAILY
Qty: 90 CAPSULE | Refills: 1 | Status: SHIPPED | OUTPATIENT
Start: 2018-02-15 | End: 2018-07-31 | Stop reason: SDUPTHER

## 2018-02-15 NOTE — TELEPHONE ENCOUNTER
----- Message from SANDRA Rabago sent at 2/14/2018  5:24 PM EST -----  If blood pressure and heart rate are normal continue Cardizem 300 mg daily. Thanks.

## 2018-03-29 ENCOUNTER — TELEPHONE (OUTPATIENT)
Dept: CARDIOLOGY | Facility: CLINIC | Age: 83
End: 2018-03-29

## 2018-03-29 NOTE — TELEPHONE ENCOUNTER
Received fax from Dr. Felix Sun for cardiac clearance for patient to have a vaginal vault suspension, posterior repair, enterocele repair, tight vaginal repair, sling, TVT, cysto. Patient is on aspirin and Eliquis. According to our records, I do not see where patient has had any stenting.         Fax [phone]  Phone [phone] (Viktoriya, nurse)

## 2018-04-25 ENCOUNTER — OFFICE VISIT (OUTPATIENT)
Dept: CARDIOLOGY | Facility: CLINIC | Age: 83
End: 2018-04-25

## 2018-04-25 VITALS
SYSTOLIC BLOOD PRESSURE: 152 MMHG | HEART RATE: 85 BPM | OXYGEN SATURATION: 94 % | HEIGHT: 65 IN | DIASTOLIC BLOOD PRESSURE: 92 MMHG | BODY MASS INDEX: 27.89 KG/M2 | WEIGHT: 167.38 LBS

## 2018-04-25 DIAGNOSIS — R01.1 MURMUR, CARDIAC: ICD-10-CM

## 2018-04-25 DIAGNOSIS — E66.3 OVERWEIGHT (BMI 25.0-29.9): ICD-10-CM

## 2018-04-25 DIAGNOSIS — R53.83 OTHER FATIGUE: ICD-10-CM

## 2018-04-25 DIAGNOSIS — Z79.899 LONG TERM CURRENT USE OF ANTIARRHYTHMIC DRUG: ICD-10-CM

## 2018-04-25 DIAGNOSIS — R00.2 PALPITATIONS: ICD-10-CM

## 2018-04-25 DIAGNOSIS — R06.02 SHORTNESS OF BREATH: ICD-10-CM

## 2018-04-25 DIAGNOSIS — Z79.01 CURRENT USE OF LONG TERM ANTICOAGULATION: ICD-10-CM

## 2018-04-25 DIAGNOSIS — I48.0 PAF (PAROXYSMAL ATRIAL FIBRILLATION) (HCC): Primary | ICD-10-CM

## 2018-04-25 DIAGNOSIS — I10 ESSENTIAL HYPERTENSION: ICD-10-CM

## 2018-04-25 PROCEDURE — 99214 OFFICE O/P EST MOD 30 MIN: CPT | Performed by: NURSE PRACTITIONER

## 2018-04-25 RX ORDER — POTASSIUM CHLORIDE 750 MG/1
10 CAPSULE, EXTENDED RELEASE ORAL DAILY
COMMUNITY

## 2018-04-25 RX ORDER — IBUPROFEN 400 MG/1
400 TABLET ORAL EVERY 6 HOURS PRN
COMMUNITY
End: 2018-11-19 | Stop reason: ALTCHOICE

## 2018-04-25 RX ORDER — METOPROLOL TARTRATE 50 MG/1
50 TABLET, FILM COATED ORAL 2 TIMES DAILY
COMMUNITY
End: 2018-06-26 | Stop reason: ALTCHOICE

## 2018-04-25 RX ORDER — ASCORBIC ACID 1000 MG
TABLET ORAL
COMMUNITY
End: 2020-07-20 | Stop reason: ALTCHOICE

## 2018-04-25 NOTE — PROGRESS NOTES
Chief Complaint   Patient presents with   • Follow-up     Had surgery 2 weeks ago. went into A-Fib after surgery. Denies chest pain, and palpitions   • Shortness of Breath     Especially when walking. Yesterday however she was short of breath while taling on the phone and was difficult.   • Other     During time in hospital she did not have good urine output and was told she may have a blockage.    • Med Refill     No refills needed at this time.       Subjective       Danay Connolly is a 83 y.o. female  with a history of palpitations and PAF. She has had a COCO and cardioversion for the PAF. She has since been maintained on anticoagulation therapy and tambocor.  In 2017,  she reported more fatigue, shortness of breath with exertion and increase in palpitations. Stress test showed no ischemic burden and good LV function, echo showed that her RSVP had improved to 40-45 mmHg.  In 2017, An overnight oxygen monitor was ordered, but report was not available, unsure if it was completed.  At her January 2017 office visit her EKG showed sinus tach, same dose Flecainide recommended. Cozaar was decreased and Cardizem was increased. A Holter monitor was ordered, frequent PVCs noted. In March, she was given clearance  for  surgical procedure. After procedure she reports an episode of atrial fib and decreased UOP. Cardiologist at [place] added Metoprolol. Patient reports cardiologist recommended cardiac evaluation of fatigue, decreased UOP and shortness of breath.   Today she returns to the office due to persistent shortness of breath and periodic palpitations. In general, she feels more weak. She admits to not sleeping well at times.     HPI     Cardiac History:    Past Surgical History:   Procedure Laterality Date   • BREAST CYST ASPIRATION     • CARDIOVASCULAR STRESS TEST  07/2011    Stress- 3 min, >100% THR. BP- 180/76. Negative   • CARDIOVASCULAR STRESS TEST  02/06/2017    EF 65%, no ischemia   • COLONOSCOPY      dorothy  polypectomy   • CONVERTED (HISTORICAL) HOLTER  06/27/2011     Holter- (Dr. Garrett)  AVG HR 73 BPM. Mul Artifacts. Few PAC's   • CONVERTED (HISTORICAL) HOLTER  04/30/2014    Holter- (Dr. Garrett) A-Fib at AVG  BPM.   • ECHO - CONVERTED  08/27/1997    Echo- EF 50%. R/O Biscupid AV   • ECHO - CONVERTED  07/2011    Echo- EF 65%. Anterior WMA. RVSP- 53mmHg   • ECHO - CONVERTED  02/06/2017    EF 65%, no AS, mild MR, RSVP 40 mmHg   • HYSTERECTOMY     • MOUTH SURGERY     • COCO  05/14/2014    COCO and Cardioversion- No thrombus, converted to sinus with 200 J.       Current Outpatient Prescriptions   Medication Sig Dispense Refill   • albuterol (PROVENTIL HFA;VENTOLIN HFA) 108 (90 BASE) MCG/ACT inhaler Inhale 2 puffs every 4 (four) hours as needed for wheezing.     • apixaban (ELIQUIS) 5 MG tablet tablet Take 5 mg by mouth 2 (two) times a day.     • aspirin 81 MG EC tablet Take 81 mg by mouth daily.     • B Complex Vitamins (VITAMIN B COMPLEX) tablet Take 1 tablet by mouth daily.     • cetirizine (ZyrTEC) 10 MG tablet Take 10 mg by mouth daily.     • Coenzyme Q10 (CO Q 10) 10 MG capsule Take  by mouth.     • diltiaZEM CD (CARDIZEM CD) 300 MG 24 hr capsule Take 1 capsule by mouth Daily. 90 capsule 1   • fenofibrate (TRICOR) 145 MG tablet Take 145 mg by mouth every night.     • flecainide (TAMBOCOR) 50 MG tablet Take 50 mg by mouth every 12 (twelve) hours.     • FLUoxetine (PROzac) 20 MG capsule Take 20 mg by mouth every night.     • furosemide (LASIX) 40 MG tablet Take 20 mg by mouth every morning. (1/2 tab qam)     • ibuprofen (ADVIL,MOTRIN) 400 MG tablet Take 400 mg by mouth Every 6 (Six) Hours As Needed for Mild Pain .     • losartan (COZAAR) 100 MG tablet Take 100 mg by mouth Daily. Decrease to 1/2 tablet daily     • meclizine (ANTIVERT) 12.5 MG tablet Take 12.5 mg by mouth 3 (Three) Times a Day As Needed for dizziness (takes only when episode of dizziness occur).     • metoprolol tartrate (LOPRESSOR) 50 MG tablet Take  50 mg by mouth 2 (Two) Times a Day.     • Multiple Vitamin (MULTI VITAMIN DAILY PO) Take 1 tablet by mouth daily.     • potassium chloride (K-DUR,KLOR-CON) 20 MEQ CR tablet Take 20 mEq by mouth Daily.     • potassium chloride (MICRO-K) 10 MEQ CR capsule Take 10 mEq by mouth 2 (Two) Times a Day.     • tiotropium (SPIRIVA) 18 MCG per inhalation capsule Place 1 capsule into inhaler and inhale 1 (one) time daily.       No current facility-administered medications for this visit.        Review of patient's allergies indicates no known allergies.    Past Medical History:   Diagnosis Date   • Arthritis    • Breast cyst     removed    • GERD (gastroesophageal reflux disease)    • H/O oral surgery    • H/O: hysterectomy    • Hx of colonoscopy with polypectomy    • Hypercholesterolemia    • Hyperlipidemia    • Hypertension    • Osteoporosis    • Seasonal allergies        Social History     Social History   • Marital status:      Spouse name: N/A   • Number of children: N/A   • Years of education: N/A     Occupational History   • Not on file.     Social History Main Topics   • Smoking status: Never Smoker   • Smokeless tobacco: Never Used   • Alcohol use No      Comment: Occasional    • Drug use: No   • Sexual activity: Not on file     Other Topics Concern   • Not on file     Social History Narrative   • No narrative on file       Family History   Problem Relation Age of Onset   • Stroke Mother    • Heart attack Father    • Hypertension Son        Review of Systems   Constitution: Positive for weakness (mild) and malaise/fatigue. Negative for decreased appetite.   HENT: Negative for congestion, hoarse voice and nosebleeds.    Eyes: Negative for discharge, redness and visual disturbance.   Cardiovascular: Positive for dyspnea on exertion and palpitations. Negative for chest pain and leg swelling.   Respiratory: Positive for shortness of breath and wheezing (cleared with deep breath).    Endocrine: Negative for  "polydipsia, polyphagia and polyuria.   Hematologic/Lymphatic: Negative for bleeding problem. Does not bruise/bleed easily.   Musculoskeletal: Positive for muscle weakness. Negative for falls and muscle cramps.   Gastrointestinal: Negative for abdominal pain, dysphagia, melena and nausea.   Genitourinary: Negative for dysuria and hematuria.   Neurological: Positive for light-headedness. Negative for headaches and tremors.   Psychiatric/Behavioral: The patient has insomnia and is nervous/anxious.         Objective     /92 (BP Location: Right arm, Patient Position: Sitting, Cuff Size: Adult)   Pulse 85   Ht 165.1 cm (65\")   Wt 75.9 kg (167 lb 6 oz)   SpO2 94%   BMI 27.85 kg/m²     Physical Exam   Constitutional: She is oriented to person, place, and time. She appears well-developed and well-nourished. She does not appear ill. No distress.   HENT:   Head: Normocephalic.   Eyes: Conjunctivae are normal. Pupils are equal, round, and reactive to light.   Neck: Normal range of motion. Neck supple. Carotid bruit is not present.   Cardiovascular: Normal rate, regular rhythm and S1 normal.    No murmur heard.  Pulses:       Radial pulses are 2+ on the right side, and 2+ on the left side.   Slightly loud S2   Pulmonary/Chest: Effort normal and breath sounds normal. She has no wheezes. She has no rales.   Abdominal: Soft. Bowel sounds are normal. She exhibits no distension. There is no tenderness.   Musculoskeletal: Normal range of motion. She exhibits no edema.   Neurological: She is alert and oriented to person, place, and time.   Skin: Skin is warm and dry. No pallor.   Psychiatric: She has a normal mood and affect.   Vitals reviewed.         ECG 12 Lead  Date/Time: 4/26/2018 12:27 PM  Performed by: GERHARD CANALES  Authorized by: GERHARD CANALES   Comparison: compared with previous ECG from 1/24/2018  Comparison to previous ECG: Sinus tach 100bpm, first degree AV block, normal QTc  Rhythm: sinus rhythm  Rate: " normal  BPM: 81  Comments: Normal QTc            Assessment/Plan      Danay was seen today for follow-up, shortness of breath, other and med refill.    Diagnoses and all orders for this visit:    PAF (paroxysmal atrial fibrillation)  -     Stress Test With Myocardial Perfusion One Day; Future  -     Adult Transthoracic Echo Complete W/ Cont if Necessary Per Protocol; Future  -     Overnight Sleep Oximetry Study; Future  -     ECG 12 Lead    Palpitations  -     Stress Test With Myocardial Perfusion One Day; Future  -     Adult Transthoracic Echo Complete W/ Cont if Necessary Per Protocol; Future  -     Overnight Sleep Oximetry Study; Future    Murmur, cardiac  -     Adult Transthoracic Echo Complete W/ Cont if Necessary Per Protocol; Future    Essential hypertension  -     Stress Test With Myocardial Perfusion One Day; Future  -     Overnight Sleep Oximetry Study; Future    Other fatigue  -     Stress Test With Myocardial Perfusion One Day; Future  -     Overnight Sleep Oximetry Study; Future    Shortness of breath  -     Stress Test With Myocardial Perfusion One Day; Future  -     Overnight Sleep Oximetry Study; Future    Long term current use of antiarrhythmic drug  -     ECG 12 Lead    Current use of long term anticoagulation    Overweight (BMI 25.0-29.9)       Labs were done during recent hospitalization, copy not available at this time. For management of PAF an EKG done today that shows sinus with normal QTc. Her blood pressure is slightly increased. Advised to continue same dose Flecainide, Cardizem, Metoprolol and Cozaar at this time. CHADVASc score is 4, on Eliquis and low dose aspirin. Due to persistent issues with shortness of breath, fatigue and palpitations a nuclear stress and echo recommended. Further recommendations from a cardiac standpoint can then be made. An overnight oxygen study also ordered due to symptoms suggestive of hypoxia.   Body mass index is 27.85 kg/m². Patient's Body mass index is  27.85 kg/m². BMI is above normal parameters. Follow-up plan includes:  nutrition counseling. Including DASH diet.   We will keep appointment already scheduled. Please call sooner for problems.              Electronically signed by SANDRA Wilder,  April 26, 2018 12:35 PM

## 2018-04-26 PROCEDURE — 93000 ELECTROCARDIOGRAM COMPLETE: CPT | Performed by: NURSE PRACTITIONER

## 2018-05-03 ENCOUNTER — HOSPITAL ENCOUNTER (OUTPATIENT)
Dept: CARDIOLOGY | Facility: HOSPITAL | Age: 83
Discharge: HOME | End: 2018-05-03

## 2018-05-03 ENCOUNTER — OUTSIDE FACILITY SERVICE (OUTPATIENT)
Dept: CARDIOLOGY | Facility: CLINIC | Age: 83
End: 2018-05-03

## 2018-05-03 DIAGNOSIS — I10 ESSENTIAL HYPERTENSION: ICD-10-CM

## 2018-05-03 DIAGNOSIS — R01.1 MURMUR, CARDIAC: ICD-10-CM

## 2018-05-03 DIAGNOSIS — R06.02 SHORTNESS OF BREATH: ICD-10-CM

## 2018-05-03 DIAGNOSIS — R00.2 PALPITATIONS: ICD-10-CM

## 2018-05-03 DIAGNOSIS — R53.83 OTHER FATIGUE: ICD-10-CM

## 2018-05-03 DIAGNOSIS — I48.0 PAF (PAROXYSMAL ATRIAL FIBRILLATION) (HCC): ICD-10-CM

## 2018-05-03 LAB
MAXIMAL PREDICTED HEART RATE: 137 BPM
MAXIMAL PREDICTED HEART RATE: 137 BPM
STRESS TARGET HR: 116 BPM
STRESS TARGET HR: 116 BPM

## 2018-05-03 PROCEDURE — 93306 TTE W/DOPPLER COMPLETE: CPT

## 2018-05-03 PROCEDURE — 93306 TTE W/DOPPLER COMPLETE: CPT | Performed by: INTERNAL MEDICINE

## 2018-05-03 PROCEDURE — 0 TECHNETIUM SESTAMIBI: Performed by: INTERNAL MEDICINE

## 2018-05-03 PROCEDURE — 78452 HT MUSCLE IMAGE SPECT MULT: CPT

## 2018-05-03 PROCEDURE — 25010000002 REGADENOSON 0.4 MG/5ML SOLUTION: Performed by: INTERNAL MEDICINE

## 2018-05-03 PROCEDURE — 93018 CV STRESS TEST I&R ONLY: CPT | Performed by: INTERNAL MEDICINE

## 2018-05-03 PROCEDURE — A9500 TC99M SESTAMIBI: HCPCS | Performed by: INTERNAL MEDICINE

## 2018-05-03 PROCEDURE — 78452 HT MUSCLE IMAGE SPECT MULT: CPT | Performed by: INTERNAL MEDICINE

## 2018-05-03 PROCEDURE — 93017 CV STRESS TEST TRACING ONLY: CPT

## 2018-05-03 RX ADMIN — TECHNETIUM TC 99M SESTAMIBI 1 DOSE: 1 INJECTION INTRAVENOUS at 12:00

## 2018-05-03 RX ADMIN — REGADENOSON 0.4 MG: 0.08 INJECTION, SOLUTION INTRAVENOUS at 12:00

## 2018-05-07 ENCOUNTER — TELEPHONE (OUTPATIENT)
Dept: CARDIOLOGY | Facility: CLINIC | Age: 83
End: 2018-05-07

## 2018-05-07 NOTE — TELEPHONE ENCOUNTER
Patient aware of stress test results and recommendations.    No definite ischemia, normal LV function.  Continue home medications.    Advised patient Wanda has sent a note to Dr. WING regarding her echo results.  (Moderate to severe MR)

## 2018-05-08 NOTE — TELEPHONE ENCOUNTER
See result note on echo.  Wanda spoke with Dr. Miles regarding moderate to severe MR.  Dr. Miles recommended left and right cardiac cath.  Patient is aware of recommendations.  She is going to talk with her family and will call me back regarding scheduling a date.

## 2018-05-22 NOTE — TELEPHONE ENCOUNTER
Patient aware of cardiac cath instructions.  She is aware to hold Eliquis 2 days prior to cardiac cath.

## 2018-06-07 ENCOUNTER — OUTSIDE FACILITY SERVICE (OUTPATIENT)
Dept: CARDIOLOGY | Facility: CLINIC | Age: 83
End: 2018-06-07

## 2018-06-07 DIAGNOSIS — I34.0 MITRAL VALVE INSUFFICIENCY, UNSPECIFIED ETIOLOGY: Primary | ICD-10-CM

## 2018-06-07 PROCEDURE — 93460 R&L HRT ART/VENTRICLE ANGIO: CPT | Performed by: INTERNAL MEDICINE

## 2018-06-12 ENCOUNTER — HOSPITAL ENCOUNTER (OUTPATIENT)
Dept: CARDIOLOGY | Facility: HOSPITAL | Age: 83
Discharge: HOME | End: 2018-06-12

## 2018-06-12 DIAGNOSIS — Z00.6 EXAMINATION FOR NORMAL COMPARISON FOR CLINICAL RESEARCH: ICD-10-CM

## 2018-06-26 ENCOUNTER — OFFICE VISIT (OUTPATIENT)
Dept: CARDIAC SURGERY | Facility: CLINIC | Age: 83
End: 2018-06-26

## 2018-06-26 VITALS
BODY MASS INDEX: 26.66 KG/M2 | HEART RATE: 102 BPM | OXYGEN SATURATION: 98 % | SYSTOLIC BLOOD PRESSURE: 135 MMHG | DIASTOLIC BLOOD PRESSURE: 80 MMHG | WEIGHT: 160 LBS | HEIGHT: 65 IN

## 2018-06-26 DIAGNOSIS — I34.0 NON-RHEUMATIC MITRAL REGURGITATION: Primary | ICD-10-CM

## 2018-06-26 PROCEDURE — 99202 OFFICE O/P NEW SF 15 MIN: CPT | Performed by: THORACIC SURGERY (CARDIOTHORACIC VASCULAR SURGERY)

## 2018-06-28 ENCOUNTER — TELEPHONE (OUTPATIENT)
Dept: CARDIOLOGY | Facility: CLINIC | Age: 83
End: 2018-06-28

## 2018-06-28 NOTE — TELEPHONE ENCOUNTER
Patient's daughter, Ekaterina Connolly called.  Requesting cardiac clearance for patient to have knee replacement with Dr. Dmitriy Reynoso.  Patient is on Eliquis 5 mg BID.    She seen Dr. Springer yesterday. According to his note, patient's shortness of breath has improved tremendously with diuretics.

## 2018-06-29 NOTE — TELEPHONE ENCOUNTER
Patient's daughter, Ekaterina Connolly aware of recommendations.  I will fax clearance letter with Dr. Miles's recommendations to Dr. Dmitriy Reynoso.

## 2018-07-19 ENCOUNTER — OFFICE VISIT (OUTPATIENT)
Dept: CARDIOLOGY | Facility: CLINIC | Age: 83
End: 2018-07-19

## 2018-07-19 VITALS
HEIGHT: 65 IN | BODY MASS INDEX: 27.16 KG/M2 | SYSTOLIC BLOOD PRESSURE: 104 MMHG | WEIGHT: 163 LBS | DIASTOLIC BLOOD PRESSURE: 62 MMHG | HEART RATE: 102 BPM

## 2018-07-19 DIAGNOSIS — Z79.01 CURRENT USE OF LONG TERM ANTICOAGULATION: ICD-10-CM

## 2018-07-19 DIAGNOSIS — R01.1 MURMUR, CARDIAC: ICD-10-CM

## 2018-07-19 DIAGNOSIS — Z79.899 LONG TERM CURRENT USE OF ANTIARRHYTHMIC DRUG: ICD-10-CM

## 2018-07-19 DIAGNOSIS — I48.0 PAF (PAROXYSMAL ATRIAL FIBRILLATION) (HCC): ICD-10-CM

## 2018-07-19 DIAGNOSIS — R94.31 PROLONGED Q-T INTERVAL ON ECG: ICD-10-CM

## 2018-07-19 DIAGNOSIS — I10 ESSENTIAL HYPERTENSION: ICD-10-CM

## 2018-07-19 DIAGNOSIS — I34.0 NON-RHEUMATIC MITRAL REGURGITATION: Primary | ICD-10-CM

## 2018-07-19 DIAGNOSIS — I47.1 JUNCTIONAL TACHYCARDIA (HCC): ICD-10-CM

## 2018-07-19 PROCEDURE — 93000 ELECTROCARDIOGRAM COMPLETE: CPT | Performed by: NURSE PRACTITIONER

## 2018-07-19 PROCEDURE — 99214 OFFICE O/P EST MOD 30 MIN: CPT | Performed by: NURSE PRACTITIONER

## 2018-07-19 RX ORDER — CALCIUM POLYCARBOPHIL 625 MG 625 MG/1
625 TABLET ORAL DAILY
COMMUNITY
End: 2018-11-19

## 2018-07-19 RX ORDER — LOSARTAN POTASSIUM 25 MG/1
25 TABLET ORAL DAILY
Qty: 30 TABLET | Refills: 3 | Status: SHIPPED | OUTPATIENT
Start: 2018-07-19 | End: 2018-08-13 | Stop reason: SDUPTHER

## 2018-07-19 NOTE — PROGRESS NOTES
Chief Complaint   Patient presents with   • Hospital Follow-up     Patient had cath beginning of June and was advised to follow with Dr. Springer for further recommendations regarding Mitral Valve. Patient saw Dr. Springer on 06/26 and was told that the valve would be monitored at this time. Pt reports that her symptoms have improved. Doesn't need refills at this time. Labs per PCP May. Didn't bring med list.        Subjective       Danay Connolly is a 84 y.o. female   with a history of palpitations and PAF. She has had a COCO and cardioversion for the PAF. She has since been maintained on anticoagulation therapy and tambocor.  In 2017,  she reported more fatigue, shortness of breath with exertion and increase in palpitations. Stress test showed no ischemic burden and good LV function, echo showed that her RSVP had improved to 40-45 mmHg.  In 2017, An overnight oxygen monitor was ordered, but report was not available, unsure if it was completed.  At her January 2017 office visit her EKG showed sinus tach, same dose Flecainide recommended. Cozaar was decreased and Cardizem was increased. A Holter monitor was ordered, frequent PVCs noted. In March, she was given clearance  for  surgical procedure. After procedure she reports an episode of atrial fib and decreased UOP. Cardiologist at [place] added Metoprolol. Patient reports cardiologist recommended cardiac evaluation of fatigue, decreased UOP and shortness of breath. On 5/3/18 repeat stress and echo were done and no ischemia noted, but MR was moderate to severe. ON 6/7/18 a cardiac cath was done and found non critical disease of her mid LAD and moderate to severe MR. Due to being more symptomatic she was referred to Dr. Springer. With medication management her symptoms improved. Given she has preserved LVEF, she did not meet surgical intervention criteria at this time. Repeat echo in 6 months advised. We also rechecked overnight oxygen in April which was mildly  abnormal. Supplemental night time oxygen ordered. Recently, Cardiac clearance for possible knee surgery with Dr. Reynoso given.   Today she comes to the office for a follow up visit and reports overall cardiac symptoms remain improved. Her main complaint today is pain in her knees. She anticipates surgery.     HPI     Cardiac History:    Past Surgical History:   Procedure Laterality Date   • BREAST CYST ASPIRATION     • CARDIAC CATHETERIZATION  06/07/2018    40% Mid LAD, Mod- Sig MR. PA- 50/21 mmHg.   • CARDIOVASCULAR STRESS TEST  07/2011    Stress- 3 min, >100% THR. BP- 180/76. Negative   • CARDIOVASCULAR STRESS TEST  02/06/2017    L. Cardiolite- Negative   • CARDIOVASCULAR STRESS TEST  05/03/2018    L. Cardiolite- Breast attenuation.   • COLONOSCOPY      wilth polypectomy   • CONVERTED (HISTORICAL) HOLTER  06/27/2011     Holter- (Dr. Garrett)  AVG HR 73 BPM. Mul Artifacts. Few PAC's   • CONVERTED (HISTORICAL) HOLTER  04/30/2014    Holter- (Dr. Garrett) A-Fib at AVG  BPM.   • CONVERTED (HISTORICAL) HOLTER  01/29/2018    AVG  BPM. Freq PVC's 12.5%. Mul PAF   • ECHO - CONVERTED  08/27/1997    Echo- EF 50%. R/O Biscupid AV   • ECHO - CONVERTED  07/2011    Echo- EF 65%. Anterior WMA. RVSP- 53mmHg   • ECHO - CONVERTED  02/06/2017    EF 65%, no AS, mild MR, RSVP 40 mmHg   • ECHO - CONVERTED  05/03/2018    EF 60%. Mod-sev MR. LA.4.8. RVSP-45 mmHg   • HYSTERECTOMY     • MOUTH SURGERY     • PELVIC FLOOR REPAIR     • COCO  05/14/2014    COCO and Cardioversion- No thrombus, converted to sinus with 200 J.       Current Outpatient Prescriptions   Medication Sig Dispense Refill   • albuterol (PROVENTIL HFA;VENTOLIN HFA) 108 (90 BASE) MCG/ACT inhaler Inhale 2 puffs every 4 (four) hours as needed for wheezing.     • apixaban (ELIQUIS) 5 MG tablet tablet Take 5 mg by mouth 2 (two) times a day.     • aspirin 81 MG EC tablet Take 81 mg by mouth daily.     • B Complex Vitamins (VITAMIN B COMPLEX) tablet Take 1 tablet by mouth  daily.     • calcium polycarbophil (FIBERCON) 625 MG tablet Take 625 mg by mouth Daily.     • cetirizine (ZyrTEC) 10 MG tablet Take 10 mg by mouth daily.     • Coenzyme Q10 (CO Q 10) 10 MG capsule Take  by mouth.     • diltiaZEM CD (CARDIZEM CD) 300 MG 24 hr capsule Take 1 capsule by mouth Daily. 90 capsule 1   • fenofibrate (TRICOR) 145 MG tablet Take 145 mg by mouth every night.     • flecainide (TAMBOCOR) 50 MG tablet Take 50 mg by mouth every 12 (twelve) hours.     • FLUoxetine (PROzac) 20 MG capsule Take 20 mg by mouth every night.     • furosemide (LASIX) 40 MG tablet Take 20 mg by mouth every morning. (1/2 tab qam)     • ibuprofen (ADVIL,MOTRIN) 400 MG tablet Take 400 mg by mouth Every 6 (Six) Hours As Needed for Mild Pain .     • losartan (COZAAR) 25 MG tablet Take 1 tablet by mouth Daily. 30 tablet 3   • magnesium oxide (MAGOX) 400 (241.3 Mg) MG tablet tablet Take 1 tablet by mouth Daily. 30 tablet 3   • meclizine (ANTIVERT) 12.5 MG tablet Take 12.5 mg by mouth 3 (Three) Times a Day As Needed for dizziness (takes only when episode of dizziness occur).     • Multiple Vitamin (MULTI VITAMIN DAILY PO) Take 1 tablet by mouth daily.     • potassium chloride (K-DUR,KLOR-CON) 20 MEQ CR tablet Take 20 mEq by mouth Daily.     • potassium chloride (MICRO-K) 10 MEQ CR capsule Take 10 mEq by mouth 2 (Two) Times a Day.     • tiotropium (SPIRIVA) 18 MCG per inhalation capsule Place 1 capsule into inhaler and inhale 1 (one) time daily.       No current facility-administered medications for this visit.        Patient has no known allergies.    Past Medical History:   Diagnosis Date   • Arthritis    • Breast cyst     removed    • GERD (gastroesophageal reflux disease)    • H/O oral surgery    • H/O: hysterectomy    • Hx of colonoscopy with polypectomy    • Hypercholesterolemia    • Hyperlipidemia    • Hypertension    • Osteoporosis    • Seasonal allergies        Social History     Social History   • Marital status:   "    Spouse name: N/A   • Number of children: 1   • Years of education: N/A     Occupational History   •       retired   •       Fourth grade     Social History Main Topics   • Smoking status: Never Smoker   • Smokeless tobacco: Never Used   • Alcohol use No      Comment: Occasional    • Drug use: No   • Sexual activity: Not on file     Other Topics Concern   • Not on file     Social History Narrative    The patient lives alone in [place].       Family History   Problem Relation Age of Onset   • Stroke Mother    • Heart attack Father    • Hypertension Son        Review of Systems   Constitution: Positive for malaise/fatigue (same). Negative for decreased appetite and weakness.   HENT: Negative for congestion and nosebleeds.    Eyes: Negative for redness and visual disturbance.   Cardiovascular: Negative for chest pain, leg swelling and near-syncope.   Respiratory: Positive for shortness of breath. Negative for sleep disturbances due to breathing (is using oxygen at night without problem).    Endocrine: Negative for polydipsia and polyuria.   Hematologic/Lymphatic: Negative for bleeding problem. Does not bruise/bleed easily.   Skin: Negative for dry skin and itching.   Musculoskeletal: Positive for joint pain (knees). Negative for falls and myalgias.   Gastrointestinal: Negative for abdominal pain, dysphagia, melena and nausea.   Genitourinary: Negative for dysuria and hematuria.   Neurological: Negative for headaches and light-headedness.   Psychiatric/Behavioral: Positive for memory loss. The patient is not nervous/anxious.         Objective     /62   Pulse 102   Ht 165.1 cm (65\")   Wt 73.9 kg (163 lb)   BMI 27.12 kg/m²     Physical Exam   Constitutional: She is oriented to person, place, and time. She appears well-nourished.   HENT:   Head: Normocephalic.   Eyes: Pupils are equal, round, and reactive to light.   Neck: Normal range of motion.   Cardiovascular: Regular rhythm, S1 normal and " S2 normal.  Tachycardia present.    Murmur heard.   Medium-pitched blowing holosystolic murmur is present with a grade of 3/6  at the apex  Pulses:       Radial pulses are 2+ on the left side.        Popliteal pulses are 2+ on the right side, and 2+ on the left side.   Pulmonary/Chest: Breath sounds normal.   Abdominal: Soft. Bowel sounds are normal.   Musculoskeletal: Normal range of motion.   Neurological: She is alert and oriented to person, place, and time.   Skin: Skin is warm.   Psychiatric: She has a normal mood and affect.          ECG 12 Lead  Date/Time: 7/20/2018 1:55 PM  Performed by: GERHARD CANALES  Authorized by: GERHARD CANALES   Comparison: compared with previous ECG from 4/25/2018  Comparison to previous ECG: Sinus, 81 bpm  Rate: tachycardic  BPM: 102  Q waves: V1 and III                  Assessment/Plan      Danay was seen today for hospital follow-up.    Diagnoses and all orders for this visit:    Non-rheumatic mitral regurgitation    Murmur, cardiac    PAF (paroxysmal atrial fibrillation) (CMS/HCC)  -     ECG 12 Lead    Junctional tachycardia (CMS/HCC)  -     ECG 12 Lead    Essential hypertension    Prolonged Q-T interval on ECG    Long term current use of antiarrhythmic drug    Current use of long term anticoagulation    Other orders  -     magnesium oxide (MAGOX) 400 (241.3 Mg) MG tablet tablet; Take 1 tablet by mouth Daily.  -     losartan (COZAAR) 25 MG tablet; Take 1 tablet by mouth Daily.    EKG today shows junctional tach with prolonged QTc. Mag ox added. Her blood pressure is low normal. We will try decrasing Cozaar to 25 mg daily. If heart rate remains increased will consider increase dose of CCB. I have asked her to return next week for repeat EKG.     Per Dr. Springer's recommendation, will plan to order repeat echocardiogram at next visit.   I have asked her to return for routine visit in 4 months.     She has been given cardiac clearance for possible knee surgery with moderate risk of  heart failure and A. Fib.     She will follow with you for management of labs.     Patient's Body mass index is 27.12 kg/m². BMI is above normal parameters. Recommendations include: nutrition counseling. Heart healthy diet encouraged. I also encouraged good hydration and avoiding caffeine.       Further recommendations based on EKG.           Electronically signed by SANDRA Wilder,  July 20, 2018 2:02 PM

## 2018-07-20 PROBLEM — Z79.899 LONG TERM CURRENT USE OF ANTIARRHYTHMIC DRUG: Status: ACTIVE | Noted: 2018-07-20

## 2018-07-20 PROBLEM — Z79.01 CURRENT USE OF LONG TERM ANTICOAGULATION: Status: ACTIVE | Noted: 2018-07-20

## 2018-07-26 ENCOUNTER — CLINICAL SUPPORT (OUTPATIENT)
Dept: CARDIOLOGY | Facility: CLINIC | Age: 83
End: 2018-07-26

## 2018-07-26 ENCOUNTER — TELEPHONE (OUTPATIENT)
Dept: CARDIOLOGY | Facility: CLINIC | Age: 83
End: 2018-07-26

## 2018-07-26 DIAGNOSIS — Z79.899 LONG TERM CURRENT USE OF ANTIARRHYTHMIC DRUG: ICD-10-CM

## 2018-07-26 DIAGNOSIS — I48.0 PAF (PAROXYSMAL ATRIAL FIBRILLATION) (HCC): Primary | ICD-10-CM

## 2018-07-26 DIAGNOSIS — Z01.30 BLOOD PRESSURE CHECK: Primary | ICD-10-CM

## 2018-07-26 PROCEDURE — 93000 ELECTROCARDIOGRAM COMPLETE: CPT | Performed by: NURSE PRACTITIONER

## 2018-07-26 NOTE — TELEPHONE ENCOUNTER
Her BP is stable but heart rate remains increased. Advise to increase Flecainide to 50 mg tid. EKG next week. Monitor heart rate and BP at home. Make sure she is avoiding a lot of caffeine in her diet.

## 2018-07-26 NOTE — PROGRESS NOTES
Procedure     ECG 12 Lead  Date/Time: 7/26/2018 6:07 PM  Performed by: GERHARD CANALES  Authorized by: GERHARD CANALES   Comparison: compared with previous ECG from 7/20/2018  Comparison to previous ECG: Sinus tach 102 bpm  Rhythm: sinus tachycardia  BPM: 105  Comments: Normal MD and QTc

## 2018-07-27 VITALS — SYSTOLIC BLOOD PRESSURE: 120 MMHG | DIASTOLIC BLOOD PRESSURE: 60 MMHG

## 2018-07-27 NOTE — TELEPHONE ENCOUNTER
Patient made aware of recommendations, patient verbalized understanding and reports unable to come Monday for EKG due to appointment in [place]. Patient to have EKG 7/31/18 at 9am, patient verbalized understanding.

## 2018-07-31 ENCOUNTER — CLINICAL SUPPORT (OUTPATIENT)
Dept: CARDIOLOGY | Facility: CLINIC | Age: 83
End: 2018-07-31

## 2018-07-31 ENCOUNTER — TELEPHONE (OUTPATIENT)
Dept: CARDIOLOGY | Facility: CLINIC | Age: 83
End: 2018-07-31

## 2018-07-31 DIAGNOSIS — I49.3 PVC (PREMATURE VENTRICULAR CONTRACTION): ICD-10-CM

## 2018-07-31 DIAGNOSIS — Z79.899 MEDICATION MANAGEMENT: Primary | ICD-10-CM

## 2018-07-31 DIAGNOSIS — I48.0 PAF (PAROXYSMAL ATRIAL FIBRILLATION) (HCC): ICD-10-CM

## 2018-07-31 PROCEDURE — 93000 ELECTROCARDIOGRAM COMPLETE: CPT | Performed by: NURSE PRACTITIONER

## 2018-08-01 ENCOUNTER — TELEPHONE (OUTPATIENT)
Dept: CARDIOLOGY | Facility: CLINIC | Age: 83
End: 2018-08-01

## 2018-08-01 RX ORDER — DILTIAZEM HYDROCHLORIDE 300 MG/1
CAPSULE, EXTENDED RELEASE ORAL
Qty: 90 CAPSULE | Refills: 1 | Status: SHIPPED | OUTPATIENT
Start: 2018-08-01 | End: 2019-01-28 | Stop reason: SDUPTHER

## 2018-08-01 NOTE — TELEPHONE ENCOUNTER
Patient aware of recommendations. Can you put patient on the schedule for Monday at 11:00 for EKG? Thank you!

## 2018-08-01 NOTE — TELEPHONE ENCOUNTER
EKG from 7/31 reviewed with Dr. Miles. Agree to increase Flecainide to 100 mg bid. EKG Monday.Thanks.

## 2018-08-06 ENCOUNTER — TELEPHONE (OUTPATIENT)
Dept: CARDIOLOGY | Facility: CLINIC | Age: 83
End: 2018-08-06

## 2018-08-06 ENCOUNTER — CLINICAL SUPPORT (OUTPATIENT)
Dept: CARDIOLOGY | Facility: CLINIC | Age: 83
End: 2018-08-06

## 2018-08-06 DIAGNOSIS — I44.0 FIRST DEGREE AV BLOCK: ICD-10-CM

## 2018-08-06 DIAGNOSIS — Z79.899 LONG TERM CURRENT USE OF ANTIARRHYTHMIC DRUG: ICD-10-CM

## 2018-08-06 DIAGNOSIS — I48.0 PAF (PAROXYSMAL ATRIAL FIBRILLATION) (HCC): Primary | ICD-10-CM

## 2018-08-06 PROCEDURE — 93000 ELECTROCARDIOGRAM COMPLETE: CPT | Performed by: NURSE PRACTITIONER

## 2018-08-06 NOTE — PROGRESS NOTES
Procedure     ECG 12 Lead  Date/Time: 8/6/2018 5:24 PM  Performed by: GERHARD CANALES  Authorized by: GERHARD CANALES   Comparison: compared with previous ECG from 7/31/2018  Comparison to previous ECG: Sinus, 1st degree AVB, 99 bpm  Rhythm: sinus rhythm  Rate: normal  BPM: 72  Conduction: 1st degree  Comments: Normal QTc

## 2018-08-13 ENCOUNTER — TELEPHONE (OUTPATIENT)
Dept: CARDIOLOGY | Facility: CLINIC | Age: 83
End: 2018-08-13

## 2018-08-13 RX ORDER — LOSARTAN POTASSIUM 25 MG/1
25 TABLET ORAL DAILY
Qty: 90 TABLET | Refills: 3 | Status: SHIPPED | OUTPATIENT
Start: 2018-08-13 | End: 2019-01-17

## 2018-08-13 NOTE — TELEPHONE ENCOUNTER
Patient called asking for refills on her losartan and magnesium to be sent to [facility]. Scripts sent.

## 2018-11-19 ENCOUNTER — OFFICE VISIT (OUTPATIENT)
Dept: CARDIOLOGY | Facility: CLINIC | Age: 83
End: 2018-11-19

## 2018-11-19 VITALS
WEIGHT: 165 LBS | BODY MASS INDEX: 27.49 KG/M2 | HEIGHT: 65 IN | SYSTOLIC BLOOD PRESSURE: 128 MMHG | HEART RATE: 67 BPM | DIASTOLIC BLOOD PRESSURE: 80 MMHG

## 2018-11-19 DIAGNOSIS — Z79.899 LONG TERM CURRENT USE OF ANTIARRHYTHMIC DRUG: ICD-10-CM

## 2018-11-19 DIAGNOSIS — I34.0 NON-RHEUMATIC MITRAL REGURGITATION: ICD-10-CM

## 2018-11-19 DIAGNOSIS — I48.0 PAF (PAROXYSMAL ATRIAL FIBRILLATION) (HCC): Primary | ICD-10-CM

## 2018-11-19 DIAGNOSIS — I44.0 FIRST DEGREE AV BLOCK: ICD-10-CM

## 2018-11-19 DIAGNOSIS — R01.1 MURMUR, CARDIAC: ICD-10-CM

## 2018-11-19 DIAGNOSIS — Z79.01 CURRENT USE OF LONG TERM ANTICOAGULATION: ICD-10-CM

## 2018-11-19 DIAGNOSIS — R06.02 SHORTNESS OF BREATH: ICD-10-CM

## 2018-11-19 PROCEDURE — 99213 OFFICE O/P EST LOW 20 MIN: CPT | Performed by: NURSE PRACTITIONER

## 2018-11-19 PROCEDURE — 93000 ELECTROCARDIOGRAM COMPLETE: CPT | Performed by: NURSE PRACTITIONER

## 2018-11-19 RX ORDER — CHOLECALCIFEROL (VITAMIN D3) 125 MCG
1 CAPSULE ORAL DAILY
COMMUNITY

## 2018-11-19 RX ORDER — FLECAINIDE ACETATE 100 MG/1
100 TABLET ORAL 2 TIMES DAILY
COMMUNITY
End: 2018-11-19 | Stop reason: SDUPTHER

## 2018-11-19 RX ORDER — FLECAINIDE ACETATE 100 MG/1
TABLET ORAL
Qty: 90 TABLET | Refills: 3 | Status: SHIPPED | OUTPATIENT
Start: 2018-11-19 | End: 2019-07-01 | Stop reason: SDUPTHER

## 2018-11-19 NOTE — PROGRESS NOTES
Chief Complaint   Patient presents with   • Follow-up     cardiac management.  Last labs in chart, May 2018 from Dr. Garrett.   • Med Refill     needs refills on mag ox, flecainide, cartia, [facility], 90 days.  at home patient is finishing up flecainide 50 mg 2 tabs BID.  Asking if ok to do script as 100 mg BID.     • Palpitations     occ palpitations with stress.   • Shortness of Breath     pt feels like her SOA with exertion has improved some.  Pt reports she is taking the Lasix and K about twice a week.       Subjective       Danay Connolly is a 84 y.o. female   with a history of palpitations and PAF. She has had a COCO and cardioversion for the PAF. She has since been maintained on anticoagulation therapy and tambocor.  In 2017,  she reported more fatigue, shortness of breath with exertion and increase in palpitations. Stress test showed no ischemic burden and good LV function, echo showed that her RSVP had improved to 40-45 mmHg.  In 2017, An overnight oxygen monitor was ordered, but report was not available, unsure if it was completed.  At her January 2017 office visit her EKG showed sinus tach, same dose Flecainide recommended. Cozaar was decreased and Cardizem was increased. A Holter monitor was ordered, frequent PVCs noted. In March, she was given clearance  for  surgical procedure. After procedure she reports an episode of atrial fib and decreased UOP. Cardiologist at [place] added Metoprolol. Patient reports cardiologist recommended cardiac evaluation of fatigue, decreased UOP and shortness of breath. On 5/3/18 repeat stress and echo were done and no ischemia noted, but MR was moderate to severe. ON 6/7/18 a cardiac cath was done and found non critical disease of her mid LAD and moderate to severe MR. Due to being more symptomatic she was referred to Dr. Springer. With medication management her symptoms improved. Given she has preserved LVEF, she did not meet surgical intervention criteria at this  time. Repeat echo in 6 months advised. We also rechecked overnight oxygen in April which was mildly abnormal. Supplemental night time oxygen ordered. Recently, Cardiac clearance for possible knee surgery with Dr. Reynoso given.     Today she comes to the office for a follow up visit. She reports improvement in palpitations and shortness of breath. She decided against knee surgery and has recently had injection.     HPI     Cardiac History:    Past Surgical History:   Procedure Laterality Date   • BREAST CYST ASPIRATION     • CARDIAC CATHETERIZATION  06/07/2018    40% Mid LAD, Mod- Sig MR. PA- 50/21 mmHg.   • CARDIOVASCULAR STRESS TEST  07/2011    Stress- 3 min, >100% THR. BP- 180/76. Negative   • CARDIOVASCULAR STRESS TEST  02/06/2017    L. Cardiolite- Negative   • CARDIOVASCULAR STRESS TEST  05/03/2018    L. Cardiolite- Breast attenuation.   • COLONOSCOPY      wilth polypectomy   • CONVERTED (HISTORICAL) HOLTER  06/27/2011     Holter- (Dr. Garrett)  AVG HR 73 BPM. Mul Artifacts. Few PAC's   • CONVERTED (HISTORICAL) HOLTER  04/30/2014    Holter- (Dr. Garrett) A-Fib at AVG  BPM.   • CONVERTED (HISTORICAL) HOLTER  01/29/2018    AVG  BPM. Freq PVC's 12.5%. Mul PAF   • ECHO - CONVERTED  08/27/1997    Echo- EF 50%. R/O Biscupid AV   • ECHO - CONVERTED  07/2011    Echo- EF 65%. Anterior WMA. RVSP- 53mmHg   • ECHO - CONVERTED  02/06/2017    EF 65%, no AS, mild MR, RSVP 40 mmHg   • ECHO - CONVERTED  05/03/2018    EF 60%. Mod-sev MR. LA.4.8. RVSP-45 mmHg   • HYSTERECTOMY     • MOUTH SURGERY     • PELVIC FLOOR REPAIR     • COCO  05/14/2014    COCO and Cardioversion- No thrombus, converted to sinus with 200 J.       Current Outpatient Medications   Medication Sig Dispense Refill   • albuterol (PROVENTIL HFA;VENTOLIN HFA) 108 (90 BASE) MCG/ACT inhaler Inhale 2 puffs every 4 (four) hours as needed for wheezing.     • apixaban (ELIQUIS) 5 MG tablet tablet Take 5 mg by mouth 2 (two) times a day.     • aspirin 81 MG EC tablet  Take 81 mg by mouth daily.     • B Complex Vitamins (VITAMIN B COMPLEX) tablet Take 1 tablet by mouth daily.     • CARTIA  MG 24 hr capsule TAKE 1 CAPSULE DAILY 90 capsule 1   • Cholecalciferol (VITAMIN D3) 2000 units tablet Take 1 tablet by mouth Daily.     • Coenzyme Q10 (CO Q 10) 10 MG capsule Take  by mouth.     • Cyanocobalamin (VITAMIN B12) 1000 MCG tablet controlled-release Take 1 tablet by mouth Daily.     • fenofibrate (TRICOR) 145 MG tablet Take 145 mg by mouth every night.     • flecainide (TAMBOCOR) 100 MG tablet Decrease to one tablet in the morning and 1/2 tablet at night 90 tablet 3   • FLUoxetine (PROzac) 20 MG capsule Take 20 mg by mouth every night.     • furosemide (LASIX) 40 MG tablet Take 20 mg by mouth every morning. (1/2 tab qam)     • losartan (COZAAR) 25 MG tablet Take 1 tablet by mouth Daily. 90 tablet 3   • magnesium oxide (MAGOX) 400 (241.3 Mg) MG tablet tablet Take 1 tablet by mouth Daily. 90 tablet 3   • meclizine (ANTIVERT) 12.5 MG tablet Take 12.5 mg by mouth 3 (Three) Times a Day As Needed for dizziness (takes only when episode of dizziness occur).     • Multiple Vitamin (MULTI VITAMIN DAILY PO) Take 1 tablet by mouth daily.     • potassium chloride (MICRO-K) 10 MEQ CR capsule Take 10 mEq by mouth Daily.     • tiotropium (SPIRIVA) 18 MCG per inhalation capsule Place 1 capsule into inhaler and inhale 1 (one) time daily.       No current facility-administered medications for this visit.        Patient has no known allergies.    Past Medical History:   Diagnosis Date   • Arthritis    • Breast cyst     removed    • GERD (gastroesophageal reflux disease)    • H/O oral surgery    • H/O: hysterectomy    • Hx of colonoscopy with polypectomy    • Hypercholesterolemia    • Hyperlipidemia    • Hypertension    • Osteoporosis    • Seasonal allergies        Social History     Socioeconomic History   • Marital status:      Spouse name: Not on file   • Number of children: 1   • Years of  "education: Not on file   • Highest education level: Not on file   Social Needs   • Financial resource strain: Not on file   • Food insecurity - worry: Not on file   • Food insecurity - inability: Not on file   • Transportation needs - medical: Not on file   • Transportation needs - non-medical: Not on file   Occupational History   • Occupation:      Comment: retired     Comment: Fourth grade   Tobacco Use   • Smoking status: Never Smoker   • Smokeless tobacco: Never Used   Substance and Sexual Activity   • Alcohol use: No     Comment: Occasional    • Drug use: No   • Sexual activity: Not on file   Other Topics Concern   • Not on file   Social History Narrative    The patient lives alone in [place].       Family History   Problem Relation Age of Onset   • Stroke Mother    • Heart attack Father    • Hypertension Son        Review of Systems   Constitution: Positive for malaise/fatigue (mild, same). Negative for decreased appetite.   HENT: Negative for congestion, hoarse voice and nosebleeds.    Eyes: Negative for visual disturbance.   Cardiovascular: Negative for chest pain, leg swelling and near-syncope.   Respiratory: Positive for shortness of breath (improved) and sleep disturbances due to breathing (she is using oxygen routinely).    Endocrine: Negative for polydipsia, polyphagia and polyuria.   Hematologic/Lymphatic: Negative for bleeding problem. Bruises/bleeds easily.   Skin: Negative for dry skin and itching.   Musculoskeletal: Positive for joint pain (knee) and stiffness. Negative for falls and muscle cramps.   Gastrointestinal: Negative for abdominal pain and melena.   Genitourinary: Negative for dysuria and hematuria.   Neurological: Negative for dizziness and headaches.   Psychiatric/Behavioral: Negative for altered mental status. The patient does not have insomnia and is not nervous/anxious.         Objective     /80 (BP Location: Right arm)   Pulse 67   Ht 165.1 cm (65\")   Wt 74.8 " kg (165 lb)   BMI 27.46 kg/m²     Physical Exam   Constitutional: She is oriented to person, place, and time. She appears well-nourished. No distress.   HENT:   Head: Normocephalic.   Eyes: Conjunctivae are normal. Pupils are equal, round, and reactive to light.   Neck: Normal range of motion. Neck supple. Carotid bruit is not present.   Cardiovascular: Normal rate, regular rhythm, S1 normal and S2 normal.   Murmur heard.  Pulses:       Radial pulses are 2+ on the right side, and 2+ on the left side.   Pulmonary/Chest: Breath sounds normal. She has no wheezes. She has no rales.   Abdominal: Soft. Bowel sounds are normal.   Musculoskeletal: Normal range of motion. She exhibits edema (trace pedal).   Neurological: She is alert and oriented to person, place, and time.   Skin: Skin is warm and dry.   Psychiatric: She has a normal mood and affect. Her behavior is normal.          ECG 12 Lead  Date/Time: 11/19/2018 4:51 PM  Performed by: Wanda Felix APRN  Authorized by: Wanda Felix APRN   Comparison: compared with previous ECG   Similar to previous ECG  Comparison to previous ECG: Sinus, first degree AV block  Rhythm: sinus rhythm  Rate: normal  BPM: 67  Conduction: 1st degree  Comments:  ms            Assessment/Plan      Danay was seen today for follow-up, med refill, palpitations and shortness of breath.    Diagnoses and all orders for this visit:    PAF (paroxysmal atrial fibrillation) (CMS/[facility], located [facility])    Current use of long term anticoagulation    Long term current use of antiarrhythmic drug    Murmur, cardiac  -     Adult Transthoracic Echo Complete W/ Cont if Necessary Per Protocol; Future    Non-rheumatic mitral regurgitation  -     Adult Transthoracic Echo Complete W/ Cont if Necessary Per Protocol; Future    Shortness of breath  -     Adult Transthoracic Echo Complete W/ Cont if Necessary Per Protocol; Future    First degree AV block    Other orders  -     Discontinue: magnesium oxide (MAGOX) 400  (241.3 Mg) MG tablet tablet; Take 1 tablet by mouth Daily.  -     magnesium oxide (MAGOX) 400 (241.3 Mg) MG tablet tablet; Take 1 tablet by mouth Daily.  -     flecainide (TAMBOCOR) 100 MG tablet; Decrease to one tablet in the morning and 1/2 tablet at night  -     ECG 12 Lead      EKG for management of PAF and medication today shows sinus with increase in MS interval. QTc slightly increased. Advised to decrease Flecainide to 100 mg in morning and 50 mg at night. She denies any signs of bleeding. Continue Eliquis. She is going out of town and cannot be in office next week for EKG. She agrees to scheduling echocardiogram.     To reassess MR, an echocardiogram ordered.     Patient's Body mass index is 27.46 kg/m². BMI is above normal parameters. Recommendations include: nutrition counseling. Continue heart healthy diet and avoid caffeine.     Further recommendations based on echo report. A 6 month follow up visit scheduled.              Electronically signed by SANDRA Wilder,  November 19, 2018 5:00 PM

## 2018-11-26 ENCOUNTER — HOSPITAL ENCOUNTER (OUTPATIENT)
Dept: CARDIOLOGY | Facility: HOSPITAL | Age: 83
Discharge: HOME | End: 2018-11-26
Admitting: NURSE PRACTITIONER

## 2018-11-26 DIAGNOSIS — I34.0 NON-RHEUMATIC MITRAL REGURGITATION: ICD-10-CM

## 2018-11-26 DIAGNOSIS — R01.1 MURMUR, CARDIAC: ICD-10-CM

## 2018-11-26 DIAGNOSIS — R06.02 SHORTNESS OF BREATH: ICD-10-CM

## 2018-11-26 PROCEDURE — 93306 TTE W/DOPPLER COMPLETE: CPT | Performed by: INTERNAL MEDICINE

## 2018-11-26 PROCEDURE — 93306 TTE W/DOPPLER COMPLETE: CPT

## 2018-11-27 LAB
BH CV ECHO MEAS - ACS: 2.1 CM
BH CV ECHO MEAS - AO MEAN PG: 4.1 MMHG
BH CV ECHO MEAS - AO ROOT AREA (BSA CORRECTED): 1.5
BH CV ECHO MEAS - AO ROOT AREA: 6.1 CM^2
BH CV ECHO MEAS - AO ROOT DIAM: 2.8 CM
BH CV ECHO MEAS - AO V2 MEAN: 95.4 CM/SEC
BH CV ECHO MEAS - AO V2 VTI: 29 CM
BH CV ECHO MEAS - BSA(HAYCOCK): 1.9 M^2
BH CV ECHO MEAS - BSA: 1.8 M^2
BH CV ECHO MEAS - BZI_BMI: 27.5 KILOGRAMS/M^2
BH CV ECHO MEAS - BZI_METRIC_HEIGHT: 165.1 CM
BH CV ECHO MEAS - BZI_METRIC_WEIGHT: 74.8 KG
BH CV ECHO MEAS - EDV(CUBED): 63.8 ML
BH CV ECHO MEAS - EDV(MOD-SP4): 58 ML
BH CV ECHO MEAS - EDV(TEICH): 69.9 ML
BH CV ECHO MEAS - EF(CUBED): 51.5 %
BH CV ECHO MEAS - EF(MOD-SP4): 51.7 %
BH CV ECHO MEAS - EF(TEICH): 44 %
BH CV ECHO MEAS - ESV(CUBED): 30.9 ML
BH CV ECHO MEAS - ESV(MOD-SP4): 28 ML
BH CV ECHO MEAS - ESV(TEICH): 39.1 ML
BH CV ECHO MEAS - FS: 21.4 %
BH CV ECHO MEAS - IVS/LVPW: 0.85
BH CV ECHO MEAS - IVSD: 1.2 CM
BH CV ECHO MEAS - LA DIMENSION: 4.5 CM
BH CV ECHO MEAS - LA/AO: 1.6
BH CV ECHO MEAS - LV DIASTOLIC VOL/BSA (35-75): 31.8 ML/M^2
BH CV ECHO MEAS - LV IVRT: 0.11 SEC
BH CV ECHO MEAS - LV MASS(C)D: 192.8 GRAMS
BH CV ECHO MEAS - LV MASS(C)DI: 105.8 GRAMS/M^2
BH CV ECHO MEAS - LV SYSTOLIC VOL/BSA (12-30): 15.4 ML/M^2
BH CV ECHO MEAS - LVIDD: 4 CM
BH CV ECHO MEAS - LVIDS: 3.1 CM
BH CV ECHO MEAS - LVLD AP4: 6 CM
BH CV ECHO MEAS - LVLS AP4: 5.7 CM
BH CV ECHO MEAS - LVOT AREA (M): 3.8 CM^2
BH CV ECHO MEAS - LVOT AREA: 3.9 CM^2
BH CV ECHO MEAS - LVOT DIAM: 2.2 CM
BH CV ECHO MEAS - LVPWD: 1.4 CM
BH CV ECHO MEAS - MV A MAX VEL: 63.2 CM/SEC
BH CV ECHO MEAS - MV DEC SLOPE: 355.3 CM/SEC^2
BH CV ECHO MEAS - MV E MAX VEL: 97.7 CM/SEC
BH CV ECHO MEAS - MV E/A: 1.5
BH CV ECHO MEAS - MV MEAN PG: 2.5 MMHG
BH CV ECHO MEAS - MV P1/2T MAX VEL: 118.1 CM/SEC
BH CV ECHO MEAS - MV P1/2T: 97.4 MSEC
BH CV ECHO MEAS - MV V2 MEAN: 74.8 CM/SEC
BH CV ECHO MEAS - MV V2 VTI: 37.5 CM
BH CV ECHO MEAS - MVA P1/2T LCG: 1.9 CM^2
BH CV ECHO MEAS - MVA(P1/2T): 2.3 CM^2
BH CV ECHO MEAS - RAP SYSTOLE: 10 MMHG
BH CV ECHO MEAS - RVDD: 2.8 CM
BH CV ECHO MEAS - RVSP: 50 MMHG
BH CV ECHO MEAS - SI(AO): 96.4 ML/M^2
BH CV ECHO MEAS - SI(CUBED): 18 ML/M^2
BH CV ECHO MEAS - SI(MOD-SP4): 16.5 ML/M^2
BH CV ECHO MEAS - SI(TEICH): 16.9 ML/M^2
BH CV ECHO MEAS - SV(AO): 175.7 ML
BH CV ECHO MEAS - SV(CUBED): 32.9 ML
BH CV ECHO MEAS - SV(MOD-SP4): 30 ML
BH CV ECHO MEAS - SV(TEICH): 30.8 ML
BH CV ECHO MEAS - TR MAX VEL: 316.3 CM/SEC
MAXIMAL PREDICTED HEART RATE: 136 BPM
STRESS TARGET HR: 116 BPM

## 2018-12-17 ENCOUNTER — TELEPHONE (OUTPATIENT)
Dept: CARDIOLOGY | Facility: CLINIC | Age: 83
End: 2018-12-17

## 2018-12-17 NOTE — TELEPHONE ENCOUNTER
Patient requesting her Mag Ox script be sent to [facility].  She has not received through her mail order.  Script was sent to [facility] on 11/19/18.  Script sent [facility].

## 2019-01-17 ENCOUNTER — OFFICE VISIT (OUTPATIENT)
Dept: CARDIOLOGY | Facility: CLINIC | Age: 84
End: 2019-01-17

## 2019-01-17 VITALS
HEART RATE: 80 BPM | BODY MASS INDEX: 27.59 KG/M2 | WEIGHT: 165.6 LBS | HEIGHT: 65 IN | SYSTOLIC BLOOD PRESSURE: 118 MMHG | DIASTOLIC BLOOD PRESSURE: 64 MMHG

## 2019-01-17 DIAGNOSIS — Z79.01 CURRENT USE OF LONG TERM ANTICOAGULATION: ICD-10-CM

## 2019-01-17 DIAGNOSIS — I44.0 FIRST DEGREE AV BLOCK: ICD-10-CM

## 2019-01-17 DIAGNOSIS — I34.0 NON-RHEUMATIC MITRAL REGURGITATION: ICD-10-CM

## 2019-01-17 DIAGNOSIS — R06.02 SHORTNESS OF BREATH: ICD-10-CM

## 2019-01-17 DIAGNOSIS — R26.89 LOSS OF BALANCE: ICD-10-CM

## 2019-01-17 DIAGNOSIS — I48.0 PAF (PAROXYSMAL ATRIAL FIBRILLATION) (HCC): Primary | ICD-10-CM

## 2019-01-17 DIAGNOSIS — I27.20 PULMONARY HYPERTENSION (HCC): ICD-10-CM

## 2019-01-17 DIAGNOSIS — R01.1 MURMUR, CARDIAC: ICD-10-CM

## 2019-01-17 DIAGNOSIS — I10 ESSENTIAL HYPERTENSION: ICD-10-CM

## 2019-01-17 DIAGNOSIS — Z79.899 LONG TERM CURRENT USE OF ANTIARRHYTHMIC DRUG: ICD-10-CM

## 2019-01-17 PROCEDURE — 99214 OFFICE O/P EST MOD 30 MIN: CPT | Performed by: NURSE PRACTITIONER

## 2019-01-17 PROCEDURE — 93000 ELECTROCARDIOGRAM COMPLETE: CPT | Performed by: NURSE PRACTITIONER

## 2019-01-17 NOTE — PROGRESS NOTES
"Chief Complaint   Patient presents with   • Follow-up     Cardiac management. Patient is taking aspirin. Patient had blood work done in November ordered by PCP.    • Med Refill     Patient is fine with medication at the moment.    • Shortness of Breath     Patient has SOB with exertion.    • Balance Issues     Patient states she is having trouble with the left knee.Pt states  PCP said the balance could be off because of medication.        Subjective       Danay Connolly is a 84 y.o. female with a history of palpitations and PAF. She had COCO and cardioversion for PAF, maintained on anticoagulation therapy and tambocor.  In 2017, tress test showed no ischemic burden and good LV function. Echo showed RSVP had improved to 40-45 mmHg.  At her January 2017 office visit her EKG showed sinus tach for which Cozaar was decreased and Cardizem was increased. A Holter monitor ordered and report showed  frequent PVCs.  In March, she underwent  surgical procedure. After procedure Cardiologist at [place] added Metoprolol for increased heart rate. On 5/3/18 repeat stress was negative for ischemia. Echo showed moderate to severe MR. On 6/7/18 a cardiac cath found non critical disease of her mid LAD and moderate to severe MR. Due to being more symptomatic she was referred to Dr. Springer. With medication management her symptoms improved. Given she has preserved LVEF, she did not meet surgical intervention criteria at that  Time, to monitor by repeat echo. IN April 2018, overnight oxygen mildly abnormal, supplemental night time oxygen ordered. At her 11/19/18 office visit, EKG showed increase in MA and QTc. Flecainide dose decreased and magnesium prescribed. On 11/26/18, echocardiogram showed LVEF 55-60%, moderate TR and PA pressure 50 mmHg. Her MR remains moderate.     Today, she comes to the office due to feeling \"off balance\". On recent trip to [place], she states she would walk then knee pain and lower back would develop making " "her loose balance for which someone would have to \"hold on\" to her. She denies dizziness, chest pain, or palpitations. She does have shortness of breath with exertion but denies being any worse at this time. She admits to feeling weak in general after doing morning chores or increased walking.     HPI     Cardiac History:    Past Surgical History:   Procedure Laterality Date   • BREAST CYST ASPIRATION     • CARDIAC CATHETERIZATION  06/07/2018    40% Mid LAD, Mod- Sig MR. PA- 50/21 mmHg.   • CARDIOVASCULAR STRESS TEST  07/2011    Stress- 3 min, >100% THR. BP- 180/76. Negative   • CARDIOVASCULAR STRESS TEST  02/06/2017    L. Cardiolite- Negative   • CARDIOVASCULAR STRESS TEST  05/03/2018    L. Cardiolite- Breast attenuation.   • COLONOSCOPY      wilth polypectomy   • CONVERTED (HISTORICAL) HOLTER  06/27/2011     Holter- (Dr. Garrett)  AVG HR 73 BPM. Mul Artifacts. Few PAC's   • CONVERTED (HISTORICAL) HOLTER  04/30/2014    Holter- (Dr. Garrett) A-Fib at AVG  BPM.   • CONVERTED (HISTORICAL) HOLTER  01/29/2018    AVG  BPM. Freq PVC's 12.5%. Mul PAF   • ECHO - CONVERTED  08/27/1997    Echo- EF 50%. R/O Biscupid AV   • ECHO - CONVERTED  07/2011    Echo- EF 65%. Anterior WMA. RVSP- 53mmHg   • ECHO - CONVERTED  02/06/2017    EF 65%, no AS, mild MR, RSVP 40 mmHg   • ECHO - CONVERTED  05/03/2018    EF 60%. Mod-sev MR. LA.4.8. RVSP-45 mmHg   • ECHO - CONVERTED  11/26/2018    EF 60%. Mod MR. RVSP- 50 mmHg.   • HYSTERECTOMY     • MOUTH SURGERY     • PELVIC FLOOR REPAIR     • STEROID INJECTION KNEE Left    • COCO  05/14/2014    COCO and Cardioversion- No thrombus, converted to sinus with 200 J.       Current Outpatient Medications   Medication Sig Dispense Refill   • albuterol (PROVENTIL HFA;VENTOLIN HFA) 108 (90 BASE) MCG/ACT inhaler Inhale 2 puffs every 4 (four) hours as needed for wheezing.     • apixaban (ELIQUIS) 5 MG tablet tablet Take 5 mg by mouth 2 (two) times a day.     • aspirin 81 MG EC tablet Take 81 mg by " mouth daily.     • B Complex Vitamins (VITAMIN B COMPLEX) tablet Take 1 tablet by mouth daily.     • CARTIA  MG 24 hr capsule TAKE 1 CAPSULE DAILY 90 capsule 1   • Cholecalciferol (VITAMIN D3) 2000 units tablet Take 1 tablet by mouth Daily.     • Coenzyme Q10 (CO Q 10) 10 MG capsule Take  by mouth.     • fenofibrate (TRICOR) 145 MG tablet Take 145 mg by mouth every night.     • flecainide (TAMBOCOR) 100 MG tablet Decrease to one tablet in the morning and 1/2 tablet at night 90 tablet 3   • FLUoxetine (PROzac) 20 MG capsule Take 20 mg by mouth every night.     • furosemide (LASIX) 40 MG tablet Take 20 mg by mouth Every Morning. Take 1/2 tablet along with potassium as needed for increased BP, swelling or shortness of breath     • magnesium oxide (MAGOX) 400 (241.3 Mg) MG tablet tablet Take 1 tablet by mouth Daily. 90 tablet 3   • meclizine (ANTIVERT) 12.5 MG tablet Take 12.5 mg by mouth 3 (Three) Times a Day As Needed for dizziness (takes only when episode of dizziness occur).     • Multiple Vitamin (MULTI VITAMIN DAILY PO) Take 1 tablet by mouth daily.     • potassium chloride (MICRO-K) 10 MEQ CR capsule Take 10 mEq by mouth Daily.     • tiotropium (SPIRIVA) 18 MCG per inhalation capsule Place 1 capsule into inhaler and inhale 1 (one) time daily.     • Cyanocobalamin (VITAMIN B12) 1000 MCG tablet controlled-release Take 1 tablet by mouth Daily.       No current facility-administered medications for this visit.        Patient has no known allergies.    Past Medical History:   Diagnosis Date   • Arthritis    • Breast cyst     removed    • GERD (gastroesophageal reflux disease)    • H/O oral surgery    • H/O: hysterectomy    • Hx of colonoscopy with polypectomy    • Hypercholesterolemia    • Hyperlipidemia    • Hypertension    • Osteoporosis    • Seasonal allergies        Social History     Socioeconomic History   • Marital status:      Spouse name: Not on file   • Number of children: 1   • Years of  education: Not on file   • Highest education level: Not on file   Social Needs   • Financial resource strain: Not on file   • Food insecurity - worry: Not on file   • Food insecurity - inability: Not on file   • Transportation needs - medical: Not on file   • Transportation needs - non-medical: Not on file   Occupational History   • Occupation:      Comment: retired     Comment: Fourth grade   Tobacco Use   • Smoking status: Never Smoker   • Smokeless tobacco: Never Used   Substance and Sexual Activity   • Alcohol use: No     Comment: Occasional    • Drug use: No   • Sexual activity: Defer   Other Topics Concern   • Not on file   Social History Narrative    The patient lives alone in [place].       Family History   Problem Relation Age of Onset   • Stroke Mother    • Heart attack Father    • Hypertension Son        Review of Systems   Constitution: Positive for weakness (episodes with exertion). Negative for decreased appetite.   HENT: Negative for congestion and nosebleeds.    Eyes: Negative for redness and visual disturbance.   Cardiovascular: Negative for chest pain, near-syncope and palpitations.   Respiratory: Positive for shortness of breath and sleep disturbances due to breathing (uses oxygen). Negative for cough.    Endocrine: Negative for polydipsia, polyphagia and polyuria.   Hematologic/Lymphatic: Negative for bleeding problem. Does not bruise/bleed easily.   Skin: Positive for skin cancer.   Musculoskeletal: Positive for back pain, joint pain (knee especially) and stiffness. Negative for falls.   Gastrointestinal: Negative for melena and nausea.   Genitourinary: Negative for dysuria and hematuria.   Neurological: Positive for loss of balance. Negative for dizziness, headaches and light-headedness.   Psychiatric/Behavioral: Negative for memory loss. The patient is not nervous/anxious.       Objective     /64 (BP Location: Left arm, Patient Position: Sitting, Cuff Size: Adult)   Pulse  "80   Ht 165.1 cm (65\")   Wt 75.1 kg (165 lb 9.6 oz)   BMI 27.56 kg/m²     Physical Exam   Constitutional: She is oriented to person, place, and time. Vital signs are normal. She appears well-nourished. No distress.   HENT:   Head: Normocephalic.   Eyes: Conjunctivae are normal. Pupils are equal, round, and reactive to light.   Neck: Normal range of motion. Neck supple. Carotid bruit is not present.   Cardiovascular: Normal rate, regular rhythm, S1 normal and S2 normal.   No murmur heard.  Pulses:       Radial pulses are 2+ on the right side, and 2+ on the left side.        Dorsalis pedis pulses are 2+ on the right side, and 2+ on the left side.   Pulmonary/Chest: Breath sounds normal. She has no wheezes. She has no rales.   Abdominal: Soft. Bowel sounds are normal. She exhibits no distension. There is no tenderness.   Musculoskeletal: Normal range of motion. She exhibits edema (trace in ankles).   Neurological: She is alert and oriented to person, place, and time.   Skin: Skin is warm and dry. No pallor.   Psychiatric: She has a normal mood and affect. Her behavior is normal.        ECG 12 Lead  Date/Time: 1/17/2019 11:30 AM  Performed by: Wanda Felix APRN  Authorized by: Wanda Felix APRN   Comparison: compared with previous ECG from 11/19/2018  Comparison to previous ECG: Previous EKG: Sinus with first degree AV block and prolonged QT  Rhythm: sinus rhythm            Assessment/Plan      Danay was seen today for follow-up, med refill, shortness of breath and balance issues.    Diagnoses and all orders for this visit:    PAF (paroxysmal atrial fibrillation) (CMS/HCC)  -     ECG 12 Lead    First degree AV block  -     ECG 12 Lead    Non-rheumatic mitral regurgitation    Pulmonary hypertension (CMS/HCC)    Murmur, cardiac    Essential hypertension    Long term current use of antiarrhythmic drug    Current use of long term anticoagulation    Shortness of breath    Loss of balance      EKG for management of " "PAF and Tambocor today shows sinus with first degree AV block, MD improved from 318 to 308ms, and normal QTc being 457ms. Her cardiac medications were discussed with Dr. Miles. Advised to continue same dose Tambocor. Due to symptoms reported, we will try stopping Losartan. Lower dose Lasix advised. She reports taking 20 mg Lasix some days. Instructed to take Lasix 20 mg with potassium daily as needed for shortness of breath, increased BP or swelling.     We discussed referral to EP. Since she is in Sinus rhythm with medication and voices she does not want invasive test (EP study) or surgery (MAZE procedure) unless absolutely necessary, she agrees to continue medication management at this time. If symptoms persist or worsen after med change as noted above, then referral to EP can be considered.     The report of November echo was reviewed which shows MR remains Moderate. We will plan repeat echo this year.     Patient's Body mass index is 27.56 kg/m². BMI is above normal parameters. Recommendations include: nutrition counseling. Continue heart healthy diet. Avoid caffeine.     Her bp is stable. Continue to monitor.    Ms. Connolly reports recent labs were \"all normal\". Please forward a copy of lab results to the office.      We will keep appointment already scheduled for May. Please call sooner for cardiac concerns.            Electronically signed by SANDRA Wilder,  January 18, 2019 9:59 AM  "

## 2019-01-28 RX ORDER — DILTIAZEM HYDROCHLORIDE 300 MG/1
CAPSULE, EXTENDED RELEASE ORAL
Qty: 90 CAPSULE | Refills: 1 | Status: SHIPPED | OUTPATIENT
Start: 2019-01-28 | End: 2019-07-27 | Stop reason: SDUPTHER

## 2019-02-07 ENCOUNTER — TELEPHONE (OUTPATIENT)
Dept: CARDIAC SURGERY | Facility: CLINIC | Age: 84
End: 2019-02-07

## 2019-02-22 ENCOUNTER — TELEPHONE (OUTPATIENT)
Dept: CARDIAC SURGERY | Facility: CLINIC | Age: 84
End: 2019-02-22

## 2019-02-22 DIAGNOSIS — I34.0 NON-RHEUMATIC MITRAL REGURGITATION: Primary | ICD-10-CM

## 2019-03-08 PROCEDURE — 93306 TTE W/DOPPLER COMPLETE: CPT | Performed by: INTERNAL MEDICINE

## 2019-03-11 ENCOUNTER — OUTSIDE FACILITY SERVICE (OUTPATIENT)
Dept: CARDIOLOGY | Facility: CLINIC | Age: 84
End: 2019-03-11

## 2019-05-14 ENCOUNTER — OFFICE VISIT (OUTPATIENT)
Dept: CARDIAC SURGERY | Facility: CLINIC | Age: 84
End: 2019-05-14

## 2019-05-14 VITALS
SYSTOLIC BLOOD PRESSURE: 151 MMHG | OXYGEN SATURATION: 99 % | HEIGHT: 65 IN | BODY MASS INDEX: 27.16 KG/M2 | WEIGHT: 163 LBS | TEMPERATURE: 97.8 F | DIASTOLIC BLOOD PRESSURE: 86 MMHG | HEART RATE: 68 BPM

## 2019-05-14 DIAGNOSIS — I34.0 NON-RHEUMATIC MITRAL REGURGITATION: Primary | ICD-10-CM

## 2019-05-14 PROCEDURE — 99213 OFFICE O/P EST LOW 20 MIN: CPT | Performed by: PHYSICIAN ASSISTANT

## 2019-05-14 NOTE — PROGRESS NOTES
05/14/2019  Patient Information  Danay Connolly                                                                                          [address]  [place] [address]   1934  'PCP/Referring Physician'  Bimal Garrett MD  [phone]  No ref. provider found    Chief Complaint   Patient presents with   • Follow-up     Follow up after Echocardiogram for Mitral valve Insufficiency       History of Present Illness:  Patient is an 84-year-old  female with history of hypercholesterolemia, paroxysmal atrial fibrillation, hypertension and mitral valve insufficiency who presents to office for review and discussion of recent echocardiogram.  The patient was last seen on 6/26/2018 and denies any interval chest pain, shortness of breath, difficulty with ambulation, syncopal episodes but does complain of some occasional palpitation.  The patient is followed closely by her cardiologist, Dr. Miles.  She is otherwise doing well.    Patient Active Problem List   Diagnosis   • Hypercholesterolemia   • PAF (paroxysmal atrial fibrillation) (CMS/HCC)   • Hypertension   • GERD (gastroesophageal reflux disease)   • First degree AV block   • Murmur, cardiac   • Non-rheumatic mitral regurgitation   • Current use of long term anticoagulation   • Long term current use of antiarrhythmic drug     Past Medical History:   Diagnosis Date   • Arthritis    • Bladder prolapse, female, acquired    • Breast cyst     removed    • GERD (gastroesophageal reflux disease)    • H/O oral surgery    • H/O: hysterectomy    • Hx of colonoscopy with polypectomy    • Hypercholesterolemia    • Hyperlipidemia    • Hypertension    • Osteoporosis    • Seasonal allergies      Past Surgical History:   Procedure Laterality Date   • BREAST CYST ASPIRATION     • CARDIAC CATHETERIZATION  06/07/2018    40% Mid LAD, Mod- Sig MR. PA- 50/21 mmHg.   • CARDIOVASCULAR STRESS TEST  07/2011    Stress- 3 min, >100% THR. BP- 180/76. Negative   •  CARDIOVASCULAR STRESS TEST  02/06/2017    L. Cardiolite- Negative   • CARDIOVASCULAR STRESS TEST  05/03/2018    L. Cardiolite- Breast attenuation.   • COLONOSCOPY      wilth polypectomy   • CONVERTED (HISTORICAL) HOLTER  06/27/2011     Holter- (Dr. Garrett)  AVG HR 73 BPM. Mul Artifacts. Few PAC's   • CONVERTED (HISTORICAL) HOLTER  04/30/2014    Holter- (Dr. Garrett) A-Fib at AVG  BPM.   • CONVERTED (HISTORICAL) HOLTER  01/29/2018    AVG  BPM. Freq PVC's 12.5%. Mul PAF   • ECHO - CONVERTED  08/27/1997    Echo- EF 50%. R/O Biscupid AV   • ECHO - CONVERTED  07/2011    Echo- EF 65%. Anterior WMA. RVSP- 53mmHg   • ECHO - CONVERTED  02/06/2017    EF 65%, no AS, mild MR, RSVP 40 mmHg   • ECHO - CONVERTED  05/03/2018    EF 60%. Mod-sev MR. LA.4.8. RVSP-45 mmHg   • ECHO - CONVERTED  11/26/2018    EF 60%. Mod MR. RVSP- 50 mmHg.   • HYSTERECTOMY     • MOUTH SURGERY     • PELVIC FLOOR REPAIR     • STEROID INJECTION KNEE Left    • COCO  05/14/2014    COCO and Cardioversion- No thrombus, converted to sinus with 200 J.       Current Outpatient Medications:   •  albuterol (PROVENTIL HFA;VENTOLIN HFA) 108 (90 BASE) MCG/ACT inhaler, Inhale 2 puffs every 4 (four) hours as needed for wheezing., Disp: , Rfl:   •  apixaban (ELIQUIS) 5 MG tablet tablet, Take 5 mg by mouth 2 (two) times a day., Disp: , Rfl:   •  aspirin 81 MG EC tablet, Take 81 mg by mouth daily., Disp: , Rfl:   •  B Complex Vitamins (VITAMIN B COMPLEX) tablet, Take 1 tablet by mouth daily., Disp: , Rfl:   •  CARTIA  MG 24 hr capsule, TAKE 1 CAPSULE DAILY, Disp: 90 capsule, Rfl: 1  •  Cholecalciferol (VITAMIN D3) 2000 units tablet, Take 1 tablet by mouth Daily., Disp: , Rfl:   •  Coenzyme Q10 (CO Q 10) 10 MG capsule, Take  by mouth., Disp: , Rfl:   •  Cyanocobalamin (VITAMIN B12) 1000 MCG tablet controlled-release, Take 1 tablet by mouth Daily., Disp: , Rfl:   •  fenofibrate (TRICOR) 145 MG tablet, Take 145 mg by mouth every night., Disp: , Rfl:   •   flecainide (TAMBOCOR) 100 MG tablet, Decrease to one tablet in the morning and 1/2 tablet at night, Disp: 90 tablet, Rfl: 3  •  FLUoxetine (PROzac) 20 MG capsule, Take 20 mg by mouth every night., Disp: , Rfl:   •  furosemide (LASIX) 40 MG tablet, Take 20 mg by mouth Every Morning. Take 1/2 tablet along with potassium as needed for increased BP, swelling or shortness of breath, Disp: , Rfl:   •  magnesium oxide (MAGOX) 400 (241.3 Mg) MG tablet tablet, Take 1 tablet by mouth Daily., Disp: 90 tablet, Rfl: 3  •  meclizine (ANTIVERT) 12.5 MG tablet, Take 12.5 mg by mouth 3 (Three) Times a Day As Needed for dizziness (takes only when episode of dizziness occur)., Disp: , Rfl:   •  Multiple Vitamin (MULTI VITAMIN DAILY PO), Take 1 tablet by mouth daily., Disp: , Rfl:   •  potassium chloride (MICRO-K) 10 MEQ CR capsule, Take 10 mEq by mouth Daily., Disp: , Rfl:   •  tiotropium (SPIRIVA) 18 MCG per inhalation capsule, Place 1 capsule into inhaler and inhale 1 (one) time daily., Disp: , Rfl:   No Known Allergies  Social History     Socioeconomic History   • Marital status:      Spouse name: Not on file   • Number of children: 1   • Years of education: Not on file   • Highest education level: Not on file   Occupational History   • Occupation:      Comment: retired     Comment: Fourth grade   Tobacco Use   • Smoking status: Never Smoker   • Smokeless tobacco: Never Used   Substance and Sexual Activity   • Alcohol use: No     Comment: Occasional    • Drug use: No   • Sexual activity: Defer   Social History Narrative    The patient lives alone in [place].     Family History   Problem Relation Age of Onset   • Stroke Mother    • Heart attack Father    • Hypertension Son      Review of Systems   Constitution: Negative for chills, diaphoresis, fever, malaise/fatigue and weight loss.   HENT: Positive for congestion and hoarse voice. Negative for hearing loss and sore throat.    Eyes: Negative for blurred vision  "and double vision.   Cardiovascular: Positive for dyspnea on exertion and palpitations. Negative for chest pain, claudication, leg swelling and syncope.   Respiratory: Positive for shortness of breath. Negative for cough, hemoptysis and wheezing.    Hematologic/Lymphatic: Bruises/bleeds easily.   Skin: Negative for itching, poor wound healing and rash.   Musculoskeletal: Positive for arthritis, back pain, joint pain and joint swelling (left knee). Negative for falls, muscle cramps, muscle weakness and neck pain.   Gastrointestinal: Positive for constipation. Negative for abdominal pain, diarrhea, hematemesis, nausea and vomiting.   Genitourinary: Positive for frequency. Negative for dysuria, hematuria, hesitancy, incomplete emptying and urgency.   Neurological: Positive for dizziness and loss of balance. Negative for headaches, light-headedness, numbness and vertigo.   Psychiatric/Behavioral: Negative for depression, memory loss and substance abuse. The patient is nervous/anxious.    Allergic/Immunologic: Positive for environmental allergies. Negative for HIV exposure.     Vitals:    05/14/19 1057   BP: 151/86   Pulse: 68   Temp: 97.8 °F (36.6 °C)   TempSrc: Temporal   SpO2: 99%   Weight: 73.9 kg (163 lb)   Height: 165.1 cm (65\")      Physical Exam:  Gen - NAD, pleasant, cooperative  CV - Regular rate and rhythm, no murmur gallop or rub  Pulm - Lungs clear to auscultation without wheeze or rhonchi   GI - Soft, normoactive bowel sounds, non-tender  Ext - Without edema  Neuro - CN II - XII grossly intact, tongue midline, voice normal    Labs/Imaging: 3/8/2019 echocardiogram performed at [facility]  1.  Left ventricular ejection fraction is around 60%  2.  Diastolic dysfunction  3.  Moderate mitral regurgitation  4.  Mild to moderate pulmonary hypertension    Assessment/Plan:  Patient is an 84-year-old  female with history of hypercholesterolemia, paroxysmal atrial fibrillation, hypertension and mitral " valve insufficiency who presents to office for review and discussion of recent echocardiogram.  The patient has been doing well since last being seen in office.  I reviewed the results of the patient's recent echocardiogram, discussing them with the patient and answering all questions to her satisfaction.  I do not believe there is evidence for surgical intervention at this time, so I will sign off on the patient but would be happy to accept the patient in the future should anything change functionally or symptomatically.  I discussed the symptoms for the patient to look out for, which may indicate worsening of her mitral valve insufficiency.  Overall, the patient may follow-up with our office as needed but should call with any questions or concerns should any arise during the interval.    Patient Active Problem List   Diagnosis   • Hypercholesterolemia   • PAF (paroxysmal atrial fibrillation) (CMS/[facility])   • Hypertension   • GERD (gastroesophageal reflux disease)   • First degree AV block   • Murmur, cardiac   • Non-rheumatic mitral regurgitation   • Current use of long term anticoagulation   • Long term current use of antiarrhythmic drug

## 2019-07-01 RX ORDER — FLECAINIDE ACETATE 100 MG/1
TABLET ORAL
Qty: 135 TABLET | Refills: 3 | Status: SHIPPED | OUTPATIENT
Start: 2019-07-01 | End: 2020-06-25

## 2019-07-15 ENCOUNTER — TELEPHONE (OUTPATIENT)
Dept: CARDIOLOGY | Facility: CLINIC | Age: 84
End: 2019-07-15

## 2019-07-15 ENCOUNTER — OFFICE VISIT (OUTPATIENT)
Dept: CARDIOLOGY | Facility: CLINIC | Age: 84
End: 2019-07-15

## 2019-07-15 VITALS
DIASTOLIC BLOOD PRESSURE: 62 MMHG | BODY MASS INDEX: 28.09 KG/M2 | HEART RATE: 61 BPM | SYSTOLIC BLOOD PRESSURE: 142 MMHG | WEIGHT: 168.6 LBS | HEIGHT: 65 IN

## 2019-07-15 DIAGNOSIS — I34.0 NON-RHEUMATIC MITRAL REGURGITATION: ICD-10-CM

## 2019-07-15 DIAGNOSIS — I10 ESSENTIAL HYPERTENSION: ICD-10-CM

## 2019-07-15 DIAGNOSIS — Z01.818 PREOPERATIVE CLEARANCE: ICD-10-CM

## 2019-07-15 DIAGNOSIS — E78.00 HYPERCHOLESTEROLEMIA: ICD-10-CM

## 2019-07-15 DIAGNOSIS — Z79.899 LONG TERM CURRENT USE OF ANTIARRHYTHMIC DRUG: ICD-10-CM

## 2019-07-15 DIAGNOSIS — Z79.01 CURRENT USE OF LONG TERM ANTICOAGULATION: ICD-10-CM

## 2019-07-15 DIAGNOSIS — R01.1 MURMUR, CARDIAC: ICD-10-CM

## 2019-07-15 DIAGNOSIS — I44.0 FIRST DEGREE AV BLOCK: ICD-10-CM

## 2019-07-15 DIAGNOSIS — I48.0 PAF (PAROXYSMAL ATRIAL FIBRILLATION) (HCC): Primary | ICD-10-CM

## 2019-07-15 PROCEDURE — 99214 OFFICE O/P EST MOD 30 MIN: CPT | Performed by: NURSE PRACTITIONER

## 2019-07-15 PROCEDURE — 93000 ELECTROCARDIOGRAM COMPLETE: CPT | Performed by: NURSE PRACTITIONER

## 2019-07-15 RX ORDER — LOSARTAN POTASSIUM 25 MG/1
25 TABLET ORAL DAILY
COMMUNITY
End: 2020-02-03

## 2019-07-15 NOTE — TELEPHONE ENCOUNTER
April,   If Dr. Miles agrees, can give cardiac clearance for knee surgery and hold Eliquis 3 days prior.  EKG today shows sinus with first degree AV block, similar to prior EKG. BP stable. She was without cardiac complaints.   Cardiac clearance request placed on your desk. Thank you.

## 2019-07-15 NOTE — PROGRESS NOTES
Chief Complaint   Patient presents with   • Follow-up     Cardiac and  PAF management. Needs  Cardiac clearance for  knee surgery per Dr. Reynoso / [facility] 7-24-19  . Patient has questions of how long to hold Eliquis prior to surgery . Most recent labs per PCP 7-18-19   • Shortness of Breath     with exertion       Subjective       Danay Connolly is a 84 y.o. female  with a history of palpitations and PAF. She had COCO and cardioversion for PAF, maintained on anticoagulation therapy and tambocor.  At her January 2017 office visit her EKG showed sinus tach for which Cozaar was decreased and Cardizem was increased. A Holter monitor ordered and report showed frequent PVCs. Stress test showed no ischemic burden and good LV function. Echo showed RSVP had improved to 40-45 mmHg.  In March, she underwent  surgical procedure. After procedure Cardiologist at [place] added Metoprolol for increased heart rate. On 5/3/18 repeat stress was negative for ischemia. Echo showed moderate to severe MR. On 6/7/18 a cardiac cath found non critical disease of her mid LAD and moderate to severe MR. Due to being more symptomatic she was referred to Dr. Springer. With medication management her symptoms improved. Given she has preserved LVEF, she did not meet surgical intervention criteria. In April 2018, overnight oxygen mildly abnormal, supplemental night time oxygen ordered. At her 11/19/18 office visit, EKG showed increase in [place] and QTc. Flecainide dose decreased and magnesium prescribed. On 11/26/18, echocardiogram showed LVEF 55-60%, moderate TR and PA pressure 50 mmHg. Her MR remains moderate. At her January 2019 visit, Losartan was stopped and Lasix changed to prn.    On 3/8/2019 she underwent echocardiogram at [facility] that showed LVEF around 60% and moderate MR.  RVSP was 45 mmHg. She was seen at Dr. Springer office and recommendation was to continue current plan of care with prn follow up.     Today she comes to the office  requesting cardiac clearance for partial replacement knee surgery.  She denies chest pain, palpitations, dizziness, lightheadedness or inappropriate shortness of breath.  She continues her normal daily activity without concern.  No medication changes are noted since her last visit.    HPI     Cardiac History:    Past Surgical History:   Procedure Laterality Date   • BREAST CYST ASPIRATION     • CARDIAC CATHETERIZATION  06/07/2018    40% Mid LAD, Mod- Sig MR. PA- 50/21 mmHg.   • CARDIOVASCULAR STRESS TEST  07/2011    Stress- 3 min, >100% THR. BP- 180/76. Negative   • CARDIOVASCULAR STRESS TEST  02/06/2017    L. Cardiolite- Negative   • CARDIOVASCULAR STRESS TEST  05/03/2018    L. Cardiolite- Breast attenuation.   • COLONOSCOPY      wilth polypectomy   • CONVERTED (HISTORICAL) HOLTER  06/27/2011     Holter- (Dr. Garrett)  AVG HR 73 BPM. Mul Artifacts. Few PAC's   • CONVERTED (HISTORICAL) HOLTER  04/30/2014    Holter- (Dr. Garrett) A-Fib at AVG  BPM.   • CONVERTED (HISTORICAL) HOLTER  01/29/2018    AVG  BPM. Freq PVC's 12.5%. Mul PAF   • ECHO - CONVERTED  08/27/1997    Echo- EF 50%. R/O Biscupid AV   • ECHO - CONVERTED  07/2011    Echo- EF 65%. Anterior WMA. RVSP- 53mmHg   • ECHO - CONVERTED  02/06/2017    EF 65%, no AS, mild MR, RSVP 40 mmHg   • ECHO - CONVERTED  05/03/2018    EF 60%. Mod-sev MR. LA.4.8. RVSP-45 mmHg   • ECHO - CONVERTED  11/26/2018    EF 60%. Mod MR. RVSP- 50 mmHg.   • ECHO - CONVERTED  03/08/2019    [facility]- TLS, LVEF 60%, moderate MR, moderate TR, RVSP 45 mmHg.    • HYSTERECTOMY     • MOUTH SURGERY     • PELVIC FLOOR REPAIR     • STEROID INJECTION KNEE Left    • COCO  05/14/2014    COCO and Cardioversion- No thrombus, converted to sinus with 200 J.       Current Outpatient Medications   Medication Sig Dispense Refill   • albuterol (PROVENTIL HFA;VENTOLIN HFA) 108 (90 BASE) MCG/ACT inhaler Inhale 2 puffs every 4 (four) hours as needed for wheezing.     • apixaban (ELIQUIS) 5 MG tablet tablet  Take 5 mg by mouth 2 (two) times a day.     • aspirin 81 MG EC tablet Take 81 mg by mouth daily.     • B Complex Vitamins (VITAMIN B COMPLEX) tablet Take 1 tablet by mouth daily.     • CARTIA  MG 24 hr capsule TAKE 1 CAPSULE DAILY 90 capsule 1   • Cholecalciferol (VITAMIN D3) 2000 units tablet Take 1 tablet by mouth Daily.     • Coenzyme Q10 (CO Q 10) 10 MG capsule Take  by mouth.     • Cyanocobalamin (VITAMIN B12) 1000 MCG tablet controlled-release Take 1 tablet by mouth Daily.     • fenofibrate (TRICOR) 145 MG tablet Take 145 mg by mouth every night.     • flecainide (TAMBOCOR) 100 MG tablet TAKE 1 TABLET IN THE MORNING AND TAKE ONE-HALF (1/2) TABLET AT NIGHT AS DIRECTED 135 tablet 3   • FLUoxetine (PROzac) 20 MG capsule Take 20 mg by mouth every night.     • furosemide (LASIX) 40 MG tablet Take 20 mg by mouth Every Morning. Take 1/2 tablet along with potassium as needed for increased BP, swelling or shortness of breath     • losartan (COZAAR) 25 MG tablet Take 25 mg by mouth Daily.     • magnesium oxide (MAGOX) 400 (241.3 Mg) MG tablet tablet Take 1 tablet by mouth Daily. 90 tablet 3   • meclizine (ANTIVERT) 12.5 MG tablet Take 12.5 mg by mouth 3 (Three) Times a Day As Needed for dizziness (takes only when episode of dizziness occur).     • Multiple Vitamin (MULTI VITAMIN DAILY PO) Take 1 tablet by mouth daily.     • potassium chloride (MICRO-K) 10 MEQ CR capsule Take 10 mEq by mouth Daily.     • tiotropium (SPIRIVA) 18 MCG per inhalation capsule Place 1 capsule into inhaler and inhale 1 (one) time daily.       No current facility-administered medications for this visit.        Patient has no known allergies.    Past Medical History:   Diagnosis Date   • Arthritis    • Bladder prolapse, female, acquired    • Breast cyst     removed    • GERD (gastroesophageal reflux disease)    • H/O oral surgery    • H/O: hysterectomy    • Hx of colonoscopy with polypectomy    • Hypercholesterolemia    • Hyperlipidemia   "  • Hypertension    • Osteoporosis    • Seasonal allergies        Social History     Socioeconomic History   • Marital status:      Spouse name: Not on file   • Number of children: 1   • Years of education: Not on file   • Highest education level: Not on file   Occupational History   • Occupation:      Comment: retired     Comment: Fourth grade   Tobacco Use   • Smoking status: Never Smoker   • Smokeless tobacco: Never Used   Substance and Sexual Activity   • Alcohol use: No     Comment: Occasional    • Drug use: No   • Sexual activity: Defer   Social History Narrative    The patient lives alone in [place].       Family History   Problem Relation Age of Onset   • Stroke Mother    • Heart attack Father    • Hypertension Son        Review of Systems   Constitution: Negative for decreased appetite and weakness.   HENT: Negative for congestion and nosebleeds.    Eyes: Negative for redness and visual disturbance.   Cardiovascular: Negative for chest pain, near-syncope and palpitations.   Respiratory: Negative for cough, shortness of breath and sleep disturbances due to breathing (uses oxygen at night).    Endocrine: Negative for polydipsia, polyphagia and polyuria.   Hematologic/Lymphatic: Negative for bleeding problem. Does not bruise/bleed easily.   Skin: Negative for flushing and itching.   Musculoskeletal: Positive for joint pain, joint swelling and stiffness. Negative for falls.   Gastrointestinal: Negative for change in bowel habit, dysphagia, heartburn, melena and nausea.   Genitourinary: Negative for dysuria and hematuria.   Neurological: Negative for dizziness and headaches.   Psychiatric/Behavioral: Negative for memory loss. The patient is not nervous/anxious.    Allergic/Immunologic: Negative for hives and persistent infections.        Objective     /62 (BP Location: Right arm)   Pulse 61   Ht 165.1 cm (65\")   Wt 76.5 kg (168 lb 9.6 oz)   BMI 28.06 kg/m²     Physical Exam "   Constitutional: She is oriented to person, place, and time. Vital signs are normal. She appears well-nourished.   HENT:   Head: Normocephalic.   Eyes: Conjunctivae are normal. Pupils are equal, round, and reactive to light.   Neck: Normal range of motion. Neck supple. Carotid bruit is not present.   Cardiovascular: Normal rate, regular rhythm, S1 normal and S2 normal.   No murmur heard.  Pulmonary/Chest: Effort normal and breath sounds normal. She has no wheezes. She has no rales.   Abdominal: Soft. Bowel sounds are normal. She exhibits no distension. There is no tenderness.   Musculoskeletal: Normal range of motion. She exhibits edema (trace in ankles). She exhibits no tenderness.   Neurological: She is alert and oriented to person, place, and time.   Skin: Skin is warm and dry.   Psychiatric: She has a normal mood and affect. Her behavior is normal.          ECG 12 Lead  Date/Time: 7/15/2019 11:11 AM  Performed by: Wanda Felix APRN  Authorized by: Wanda Felix APRN   Comparison: compared with previous ECG from 1/17/2019  Similar to previous ECG  Comparison to previous ECG: Sinus, first degree AV block, normal QTc  Rhythm: sinus rhythm  Rate: normal  BPM: 61  Conduction: 1st degree AV block                Assessment/Plan      Danay was seen today for follow-up and shortness of breath.    Diagnoses and all orders for this visit:    PAF (paroxysmal atrial fibrillation) (CMS/[facility])  -     ECG 12 Lead    Long term current use of antiarrhythmic drug  -     ECG 12 Lead    Current use of long term anticoagulation    Non-rheumatic mitral regurgitation    Murmur, cardiac    Essential hypertension    Hypercholesterolemia    First degree AV block  -     ECG 12 Lead    Preoperative clearance      EKG for management of PAF with flecainide therapy done today shows sinus rhythm with first-degree block similar to prior EKG.  Same dose of flecainide advised. Due to CHADsVASc score of 5, she is on Eliquis.  Currently she  denies signs of bleeding or increased bruising.     The report of her most recent echo was reviewed, which has also been reviewed by Dr. Springer according to his most recent office note. MR appears no worse. Will continue to monitor.     She does request cardiac clearance for knee surgery.  She can undergo procedure with moderate cardiac risk and can hold Eliquis 3 days prior. Cardiac clearance letter will be sent to Dr. Reynoso.     Her blood pressure is stable and heart rate is normal.  Same dose of calcium channel blocker and ARB advised.    Patient's Body mass index is 28.06 kg/m². BMI is above normal parameters. Recommendations include: nutrition counseling.  Mediterranean diet encouraged.    For lipid management she is on fenofibrate and diet. Continue same. She will follow with you for lipid panel lab orders.      A routine follow-up visit scheduled.  Please call sooner for any cardiac concerns.           Electronically signed by SANDRA Wilder,  July 16, 2019 7:32 AM

## 2019-07-15 NOTE — PATIENT INSTRUCTIONS
Apixaban oral tablets  What is this medicine?  APIXABAN (a PIX a ban) is an anticoagulant (blood thinner). It is used to lower the chance of stroke in people with a medical condition called atrial fibrillation. It is also used to treat or prevent blood clots in the lungs or in the veins.  This medicine may be used for other purposes; ask your health care provider or pharmacist if you have questions.  COMMON BRAND NAME(S): Eliquis  What should I tell my health care provider before I take this medicine?  They need to know if you have any of these conditions:  -bleeding disorders  -bleeding in the brain  -blood in your stools (black or tarry stools) or if you have blood in your vomit  -history of stomach bleeding  -kidney disease  -liver disease  -mechanical heart valve  -an unusual or allergic reaction to apixaban, other medicines, foods, dyes, or preservatives  -pregnant or trying to get pregnant  -breast-feeding  How should I use this medicine?  Take this medicine by mouth with a glass of water. Follow the directions on the prescription label. You can take it with or without food. If it upsets your stomach, take it with food. Take your medicine at regular intervals. Do not take it more often than directed. Do not stop taking except on your doctor's advice. Stopping this medicine may increase your risk of a blood clot. Be sure to refill your prescription before you run out of medicine.  Talk to your pediatrician regarding the use of this medicine in children. Special care may be needed.  Overdosage: If you think you have taken too much of this medicine contact a poison control center or emergency room at once.  NOTE: This medicine is only for you. Do not share this medicine with others.  What if I miss a dose?  If you miss a dose, take it as soon as you can. If it is almost time for your next dose, take only that dose. Do not take double or extra doses.  What may interact with this medicine?  This medicine may  interact with the following:  -aspirin and aspirin-like medicines  -certain medicines for fungal infections like ketoconazole and itraconazole  -certain medicines for seizures like carbamazepine and phenytoin  -certain medicines that treat or prevent blood clots like warfarin, enoxaparin, and dalteparin  -clarithromycin  -NSAIDs, medicines for pain and inflammation, like ibuprofen or naproxen  -rifampin  -ritonavir  -Ethan's wort  This list may not describe all possible interactions. Give your health care provider a list of all the medicines, herbs, non-prescription drugs, or dietary supplements you use. Also tell them if you smoke, drink alcohol, or use illegal drugs. Some items may interact with your medicine.  What should I watch for while using this medicine?  Visit your healthcare professional for regular checks on your progress. You may need blood work done while you are taking this medicine. Your condition will be monitored carefully while you are receiving this medicine. It is important not to miss any appointments.  Avoid sports and activities that might cause injury while you are using this medicine. Severe falls or injuries can cause unseen bleeding. Be careful when using sharp tools or knives. Consider using an electric razor. Take special care brushing or flossing your teeth. Report any injuries, bruising, or red spots on the skin to your healthcare professional.  If you are going to need surgery or other procedure, tell your healthcare professional that you are taking this medicine.  Wear a medical ID bracelet or chain. Carry a card that describes your disease and details of your medicine and dosage times.  What side effects may I notice from receiving this medicine?  Side effects that you should report to your doctor or health care professional as soon as possible:  -allergic reactions like skin rash, itching or hives, swelling of the face, lips, or tongue  -signs and symptoms of bleeding such as  bloody or black, tarry stools; red or dark-brown urine; spitting up blood or brown material that looks like coffee grounds; red spots on the skin; unusual bruising or bleeding from the eye, gums, or nose  -signs and symptoms of a blood clot such as chest pain; shortness of breath; pain, swelling, or warmth in the leg  -signs and symptoms of a stroke such as changes in vision; confusion; trouble speaking or understanding; severe headaches; sudden numbness or weakness of the face, arm or leg; trouble walking; dizziness; loss of coordination  This list may not describe all possible side effects. Call your doctor for medical advice about side effects. You may report side effects to FDA at 1-541-DIT-3087.  Where should I keep my medicine?  Keep out of the reach of children.  Store at room temperature between 20 and 25 degrees C (68 and 77 degrees F). Throw away any unused medicine after the expiration date.  NOTE: This sheet is a summary. It may not cover all possible information. If you have questions about this medicine, talk to your doctor, pharmacist, or health care provider.  © 2019 Elsevier/Gold Standard (2018-12-13 11:20:07)  Palpitations  A palpitation is the feeling that your heartbeat is irregular or is faster than normal. It may feel like your heart is fluttering or skipping a beat. Palpitations are usually not a serious problem. They may be caused by many things, including smoking, caffeine, alcohol, stress, and certain medicines. Although most causes of palpitations are not serious, palpitations can be a sign of a serious medical problem. In some cases, you may need further medical evaluation.  Follow these instructions at home:  Pay attention to any changes in your symptoms. Take these actions to help with your condition:  · Avoid the following:  ? Caffeinated coffee, tea, soft drinks, diet pills, and energy drinks.  ? Chocolate.  ? Alcohol.  · Do not use any tobacco products, such as cigarettes, chewing  tobacco, and e-cigarettes. If you need help quitting, ask your health care provider.  · Try to reduce your stress and anxiety. Things that can help you relax include:  ? Yoga.  ? Meditation.  ? Physical activity, such as swimming, jogging, or walking.  ? Biofeedback. This is a method that helps you learn to use your mind to control things in your body, such as your heartbeats.  · Get plenty of rest and sleep.  · Take over-the-counter and prescription medicines only as told by your health care provider.  · Keep all follow-up visits as told by your health care provider. This is important.    Contact a health care provider if:  · You continue to have a fast or irregular heartbeat after 24 hours.  · Your palpitations occur more often.  Get help right away if:  · You have chest pain or shortness of breath.  · You have a severe headache.  · You feel dizzy or you faint.  This information is not intended to replace advice given to you by your health care provider. Make sure you discuss any questions you have with your health care provider.  Document Released: 12/15/2001 Document Revised: 05/22/2017 Document Reviewed: 09/01/2016  [employer] Interactive Patient Education © 2019 [employer].

## 2019-07-23 NOTE — TELEPHONE ENCOUNTER
You had stopped pt's Losartan in January but it was back on her list at her recent appointment in July. I haven't been able to notify the patient, she has surgery tomorrow. Is it ok to just go ahead and refill?

## 2019-07-24 RX ORDER — LOSARTAN POTASSIUM 25 MG/1
TABLET ORAL
Qty: 90 TABLET | Refills: 3 | Status: SHIPPED | OUTPATIENT
Start: 2019-07-24 | End: 2020-02-03

## 2019-07-29 RX ORDER — DILTIAZEM HYDROCHLORIDE 300 MG/1
CAPSULE, EXTENDED RELEASE ORAL
Qty: 90 CAPSULE | Refills: 1 | Status: SHIPPED | OUTPATIENT
Start: 2019-07-29 | End: 2020-01-27

## 2019-07-31 ENCOUNTER — TELEPHONE (OUTPATIENT)
Dept: CARDIOLOGY | Facility: CLINIC | Age: 84
End: 2019-07-31

## 2019-07-31 NOTE — TELEPHONE ENCOUNTER
I spoke with patient  About recommendations to stay off of Losartan . Ekaterina will call next week with BP Log

## 2019-07-31 NOTE — TELEPHONE ENCOUNTER
Patients daughter in law called about Losartan , she states that Alix blood pressure is excellent and had been through knee surgery and recovery. Do you want her to resumes taking Losartan , she has not been taking ?

## 2020-01-27 RX ORDER — DILTIAZEM HYDROCHLORIDE 300 MG/1
CAPSULE, EXTENDED RELEASE ORAL
Qty: 90 CAPSULE | Refills: 4 | Status: ON HOLD | OUTPATIENT
Start: 2020-01-27 | End: 2021-07-13

## 2020-02-03 ENCOUNTER — OFFICE VISIT (OUTPATIENT)
Dept: CARDIOLOGY | Facility: CLINIC | Age: 85
End: 2020-02-03

## 2020-02-03 ENCOUNTER — LAB (OUTPATIENT)
Dept: LAB | Facility: HOSPITAL | Age: 85
End: 2020-02-03

## 2020-02-03 VITALS
WEIGHT: 167.4 LBS | HEART RATE: 85 BPM | SYSTOLIC BLOOD PRESSURE: 160 MMHG | BODY MASS INDEX: 27.89 KG/M2 | DIASTOLIC BLOOD PRESSURE: 88 MMHG | HEIGHT: 65 IN

## 2020-02-03 DIAGNOSIS — R01.1 MURMUR, CARDIAC: ICD-10-CM

## 2020-02-03 DIAGNOSIS — R06.02 SHORTNESS OF BREATH: ICD-10-CM

## 2020-02-03 DIAGNOSIS — Z79.899 LONG TERM CURRENT USE OF ANTIARRHYTHMIC DRUG: ICD-10-CM

## 2020-02-03 DIAGNOSIS — I10 ESSENTIAL HYPERTENSION: ICD-10-CM

## 2020-02-03 DIAGNOSIS — I27.20 PULMONARY HYPERTENSION (HCC): ICD-10-CM

## 2020-02-03 DIAGNOSIS — E78.00 HYPERCHOLESTEROLEMIA: ICD-10-CM

## 2020-02-03 DIAGNOSIS — I49.3 PVC (PREMATURE VENTRICULAR CONTRACTION): ICD-10-CM

## 2020-02-03 DIAGNOSIS — Z79.01 CURRENT USE OF LONG TERM ANTICOAGULATION: ICD-10-CM

## 2020-02-03 DIAGNOSIS — I48.0 PAF (PAROXYSMAL ATRIAL FIBRILLATION) (HCC): Primary | ICD-10-CM

## 2020-02-03 DIAGNOSIS — I34.0 NON-RHEUMATIC MITRAL REGURGITATION: ICD-10-CM

## 2020-02-03 DIAGNOSIS — I48.0 PAF (PAROXYSMAL ATRIAL FIBRILLATION) (HCC): ICD-10-CM

## 2020-02-03 LAB
ALBUMIN SERPL-MCNC: 4.73 G/DL (ref 3.5–5.2)
ALBUMIN/GLOB SERPL: 1.8 G/DL
ALP SERPL-CCNC: 62 U/L (ref 39–117)
ALT SERPL W P-5'-P-CCNC: 10 U/L (ref 1–33)
ANION GAP SERPL CALCULATED.3IONS-SCNC: 16.5 MMOL/L (ref 5–15)
AST SERPL-CCNC: 18 U/L (ref 1–32)
BILIRUB SERPL-MCNC: 0.4 MG/DL (ref 0.2–1.2)
BUN BLD-MCNC: 22 MG/DL (ref 8–23)
BUN/CREAT SERPL: 18.8 (ref 7–25)
CALCIUM SPEC-SCNC: 10.4 MG/DL (ref 8.6–10.5)
CHLORIDE SERPL-SCNC: 101 MMOL/L (ref 98–107)
CO2 SERPL-SCNC: 24.5 MMOL/L (ref 22–29)
CREAT BLD-MCNC: 1.17 MG/DL (ref 0.57–1)
DEPRECATED RDW RBC AUTO: 45.5 FL (ref 37–54)
ERYTHROCYTE [DISTWIDTH] IN BLOOD BY AUTOMATED COUNT: 13.1 % (ref 12.3–15.4)
GFR SERPL CREATININE-BSD FRML MDRD: 44 ML/MIN/1.73
GLOBULIN UR ELPH-MCNC: 2.7 GM/DL
GLUCOSE BLD-MCNC: 108 MG/DL (ref 65–99)
HCT VFR BLD AUTO: 47.7 % (ref 34–46.6)
HGB BLD-MCNC: 14.2 G/DL (ref 12–15.9)
MAGNESIUM SERPL-MCNC: 2.2 MG/DL (ref 1.6–2.4)
MCH RBC QN AUTO: 28.4 PG (ref 26.6–33)
MCHC RBC AUTO-ENTMCNC: 29.8 G/DL (ref 31.5–35.7)
MCV RBC AUTO: 95.4 FL (ref 79–97)
PLATELET # BLD AUTO: 367 10*3/MM3 (ref 140–450)
PMV BLD AUTO: 10 FL (ref 6–12)
POTASSIUM BLD-SCNC: 4.4 MMOL/L (ref 3.5–5.2)
PROT SERPL-MCNC: 7.4 G/DL (ref 6–8.5)
RBC # BLD AUTO: 5 10*6/MM3 (ref 3.77–5.28)
SODIUM BLD-SCNC: 142 MMOL/L (ref 136–145)
TSH SERPL DL<=0.05 MIU/L-ACNC: 2.21 UIU/ML (ref 0.27–4.2)
WBC NRBC COR # BLD: 8.3 10*3/MM3 (ref 3.4–10.8)

## 2020-02-03 PROCEDURE — 80053 COMPREHEN METABOLIC PANEL: CPT | Performed by: NURSE PRACTITIONER

## 2020-02-03 PROCEDURE — 83735 ASSAY OF MAGNESIUM: CPT | Performed by: NURSE PRACTITIONER

## 2020-02-03 PROCEDURE — 36415 COLL VENOUS BLD VENIPUNCTURE: CPT

## 2020-02-03 PROCEDURE — 99214 OFFICE O/P EST MOD 30 MIN: CPT | Performed by: NURSE PRACTITIONER

## 2020-02-03 PROCEDURE — 84443 ASSAY THYROID STIM HORMONE: CPT | Performed by: NURSE PRACTITIONER

## 2020-02-03 PROCEDURE — 85027 COMPLETE CBC AUTOMATED: CPT | Performed by: NURSE PRACTITIONER

## 2020-02-03 PROCEDURE — 93000 ELECTROCARDIOGRAM COMPLETE: CPT | Performed by: NURSE PRACTITIONER

## 2020-02-03 RX ORDER — VALSARTAN 40 MG/1
40 TABLET ORAL DAILY
Qty: 30 TABLET | Refills: 0 | Status: SHIPPED | OUTPATIENT
Start: 2020-02-03 | End: 2020-07-20 | Stop reason: SDUPTHER

## 2020-02-03 NOTE — PROGRESS NOTES
Chief Complaint   Patient presents with   • Follow-up     Cardiac management . Had knee replacement  Autumn 2019   • Palpitations     On occasion is accompanied with SOA   • Hypertension     Has been experiencing high bp lately, is following with Dr. Perry -Nephrology and has the option to take Mybetriq but is unable to start it at this  time  d/t HTN.. she has brought home monitor in and was checked against manual cuff with similar readings.   • Med Refill     No refills needed today. Had medication list with her today.       Subjective       Danay Connolly is a 85 y.o. female with a history of palpitations and PAF. She had COOC and cardioversion for PAF, maintained on anticoagulation therapy and tambocor.  At her January 2017 office visit her EKG showed sinus tach for which Cozaar was decreased and Cardizem was increased. A Holter monitor ordered and report showed frequent PVCs. Stress test showed no ischemic burden and good LV function. Echo showed RSVP had improved to 40-45 mmHg.  In March, she underwent  surgical procedure. After procedure Cardiologist at [place] added Metoprolol for increased heart rate. On 5/3/18 repeat stress was negative for ischemia. Echo showed moderate to severe MR. On 6/7/18 a cardiac cath found non critical disease of her mid LAD and moderate to severe MR. Due to being more symptomatic she was referred to Dr. Springer. With medication management her symptoms improved. Given she has preserved LVEF, she did not meet surgical intervention criteria. In April 2018, overnight oxygen mildly abnormal, supplemental night time oxygen ordered. At her 11/19/18 office visit, EKG showed increase in OR and QTc. Flecainide dose decreased and magnesium prescribed. On 11/26/18, echocardiogram showed LVEF 55-60%, moderate TR and PA pressure 50 mmHg. Her MR remains moderate. At her January 2019 visit, Losartan was stopped and Lasix changed to prn.     On 3/8/2019 she underwent echocardiogram at [facility]  that showed LVEF around 60% and moderate MR.  RVSP was 45 mmHg. She was seen at Dr. Springer office and recommendation was to continue current plan of care with prn follow up.   At her July 2019 office visit she requested cardiac clearance for partial knee replacement surgery. She underwent surgery without issues. Later, she stopped losartan due to recall.  At that time her blood pressure was normal.    Today she comes the office for a follow-up visit.  She denies chest pain.  She does admit to increased shortness of breath when walking up her driveway after getting the mail.  At that time her heart rate will increase.  Otherwise, she denies palpitations.  No dizziness noted.  Her main concern is bladder incontinence for which she is wanting to start a medication but due to increased blood pressure has not been able to do so.    HPI     Cardiac History:    Past Surgical History:   Procedure Laterality Date   • BREAST CYST ASPIRATION     • CARDIAC CATHETERIZATION  06/07/2018    40% Mid LAD, Mod- Sig MR. PA- 50/21 mmHg.   • CARDIOVASCULAR STRESS TEST  07/2011    Stress- 3 min, >100% THR. BP- 180/76. Negative   • CARDIOVASCULAR STRESS TEST  02/06/2017    L. Cardiolite- Negative   • CARDIOVASCULAR STRESS TEST  05/03/2018    L. Cardiolite- Breast attenuation.   • COLONOSCOPY      wilth polypectomy   • CONVERTED (HISTORICAL) HOLTER  06/27/2011     Holter- (Dr. Garrett)  AVG HR 73 BPM. Mul Artifacts. Few PAC's   • CONVERTED (HISTORICAL) HOLTER  04/30/2014    Holter- (Dr. Garrett) A-Fib at AVG  BPM.   • CONVERTED (HISTORICAL) HOLTER  01/29/2018    AVG  BPM. Freq PVC's 12.5%. Mul PAF   • ECHO - CONVERTED  08/27/1997    Echo- EF 50%. R/O Biscupid AV   • ECHO - CONVERTED  07/2011    Echo- EF 65%. Anterior WMA. RVSP- 53mmHg   • ECHO - CONVERTED  02/06/2017    EF 65%, no AS, mild MR, RSVP 40 mmHg   • ECHO - CONVERTED  05/03/2018    EF 60%. Mod-sev MR. LA.4.8. RVSP-45 mmHg   • ECHO - CONVERTED  11/26/2018    EF 60%. Mod  MR. RVSP- 50 mmHg.   • ECHO - CONVERTED  03/08/2019    [facility]- TLS, LVEF 60%, moderate MR, moderate TR, RVSP 45 mmHg.    • HYSTERECTOMY     • MOUTH SURGERY     • PELVIC FLOOR REPAIR     • STEROID INJECTION KNEE Left    • COCO  05/14/2014    COCO and Cardioversion- No thrombus, converted to sinus with 200 J.       Current Outpatient Medications   Medication Sig Dispense Refill   • albuterol (PROVENTIL HFA;VENTOLIN HFA) 108 (90 BASE) MCG/ACT inhaler Inhale 2 puffs every 4 (four) hours as needed for wheezing.     • apixaban (ELIQUIS) 5 MG tablet tablet Take 5 mg by mouth 2 (two) times a day.     • aspirin 81 MG EC tablet Take 81 mg by mouth daily.     • B Complex Vitamins (VITAMIN B COMPLEX) tablet Take 1 tablet by mouth daily.     • CARTIA  MG 24 hr capsule TAKE 1 CAPSULE DAILY 90 capsule 4   • Cholecalciferol (VITAMIN D3) 2000 units tablet Take 1 tablet by mouth Daily.     • Coenzyme Q10 (CO Q 10) 10 MG capsule Take  by mouth.     • Cyanocobalamin (VITAMIN B12) 1000 MCG tablet controlled-release Take 1 tablet by mouth Daily.     • fenofibrate (TRICOR) 145 MG tablet Take 145 mg by mouth every night.     • flecainide (TAMBOCOR) 100 MG tablet TAKE 1 TABLET IN THE MORNING AND TAKE ONE-HALF (1/2) TABLET AT NIGHT AS DIRECTED 135 tablet 3   • FLUoxetine (PROzac) 20 MG capsule Take 20 mg by mouth every night.     • magnesium oxide (MAGOX) 400 (241.3 Mg) MG tablet tablet Take 1 tablet by mouth Daily. 90 tablet 3   • meclizine (ANTIVERT) 12.5 MG tablet Take 12.5 mg by mouth 3 (Three) Times a Day As Needed for dizziness (takes only when episode of dizziness occur).     • Multiple Vitamin (MULTI VITAMIN DAILY PO) Take 1 tablet by mouth daily.     • potassium chloride (MICRO-K) 10 MEQ CR capsule Take 10 mEq by mouth Daily.     • tiotropium (SPIRIVA) 18 MCG per inhalation capsule Place 1 capsule into inhaler and inhale 1 (one) time daily.     • furosemide (LASIX) 40 MG tablet Take 20 mg by mouth Every Morning. Take 1/2 tablet  along with potassium as needed for increased BP, swelling or shortness of breath     • valsartan (DIOVAN) 40 MG tablet Take 1 tablet by mouth Daily. 30 tablet 0     No current facility-administered medications for this visit.        Patient has no known allergies.    Past Medical History:   Diagnosis Date   • Arthritis    • Bladder prolapse, female, acquired    • Breast cyst     removed    • GERD (gastroesophageal reflux disease)    • H/O oral surgery    • H/O: hysterectomy    • Hx of colonoscopy with polypectomy    • Hypercholesterolemia    • Hyperlipidemia    • Hypertension    • Osteoporosis    • Seasonal allergies        Social History     Socioeconomic History   • Marital status:      Spouse name: Not on file   • Number of children: 1   • Years of education: Not on file   • Highest education level: Not on file   Occupational History   • Occupation:      Comment: retired     Comment: Fourth grade   Tobacco Use   • Smoking status: Never Smoker   • Smokeless tobacco: Never Used   Substance and Sexual Activity   • Alcohol use: No     Comment: Occasional    • Drug use: No   • Sexual activity: Defer   Social History Narrative    The patient lives alone in [place].       Family History   Problem Relation Age of Onset   • Stroke Mother    • Heart attack Father    • Hypertension Son        Review of Systems   Constitution: Positive for malaise/fatigue (same). Negative for decreased appetite, diaphoresis and fever.   HENT: Negative for congestion, hoarse voice and nosebleeds.    Eyes: Negative for redness and visual disturbance.   Cardiovascular: Positive for leg swelling and palpitations (heart rate gets faster with exertion). Negative for chest pain and near-syncope.   Respiratory: Positive for shortness of breath. Negative for cough and sleep disturbances due to breathing (uses oxygen at night without issue).    Endocrine: Negative for polydipsia, polyphagia and polyuria.   Hematologic/Lymphatic:  "Negative for bleeding problem. Bruises/bleeds easily.   Skin: Negative for flushing, itching and nail changes.   Musculoskeletal: Positive for arthritis and stiffness. Negative for falls, muscle cramps and myalgias.   Gastrointestinal: Negative for change in bowel habit, heartburn and melena.   Genitourinary: Positive for bladder incontinence. Negative for hematuria.   Neurological: Positive for loss of balance (currently in PT, uses cane at times). Negative for dizziness and headaches.   Psychiatric/Behavioral: The patient does not have insomnia and is not nervous/anxious.    Allergic/Immunologic: Negative for hives and persistent infections.        Objective     /88 (BP Location: Right arm)   Pulse 85   Ht 165.1 cm (65\")   Wt 75.9 kg (167 lb 6.4 oz)   BMI 27.86 kg/m²     Physical Exam   Constitutional: She is oriented to person, place, and time. She appears well-nourished.   HENT:   Head: Normocephalic.   Eyes: Pupils are equal, round, and reactive to light. Conjunctivae are normal.   Neck: Normal range of motion. Neck supple. Carotid bruit is not present.   Cardiovascular: Normal rate, S1 normal and S2 normal. An irregular rhythm present.   No murmur heard.  Pulses:       Radial pulses are 2+ on the right side, and 2+ on the left side.   Pulmonary/Chest: Breath sounds normal. She has no wheezes. She has no rales.   Abdominal: Soft. Bowel sounds are normal. She exhibits no distension. There is no tenderness.   Musculoskeletal: Normal range of motion. She exhibits edema (1+ ankles).   Neurological: She is alert and oriented to person, place, and time.   Skin: Skin is warm. No pallor.   Psychiatric: She has a normal mood and affect. Her behavior is normal.          ECG 12 Lead  Date/Time: 2/3/2020 12:13 PM  Performed by: Wanda Felix APRN  Authorized by: Wanda Felix APRN   Comparison: compared with previous ECG from 7/15/2019  Comparison to previous ECG: Sinus first degree AV block  Rhythm: atrial " fibrillation  Ectopy: multifocal PVCs  BPM: 85                  Assessment/Plan      Danay was seen today for follow-up, palpitations, hypertension and med refill.    Diagnoses and all orders for this visit:    PAF (paroxysmal atrial fibrillation) (CMS/HCC)  -     TSH; Future  -     Magnesium; Future    Current use of long term anticoagulation  -     CBC (No Diff); Future    Long term current use of antiarrhythmic drug  -     Comprehensive Metabolic Panel; Future  -     TSH; Future  -     Magnesium; Future    PVC (premature ventricular contraction)    Essential hypertension  -     Comprehensive Metabolic Panel; Future  -     CBC (No Diff); Future    Hypercholesterolemia    Non-rheumatic mitral regurgitation    Murmur, cardiac    Pulmonary hypertension (CMS/HCC)    Shortness of breath  -     Comprehensive Metabolic Panel; Future  -     CBC (No Diff); Future    Other orders  -     ECG 12 Lead  -     valsartan (DIOVAN) 40 MG tablet; Take 1 tablet by mouth Daily.       PAF/medication/PVCs- EKG today shows atrial fibrillation with PVCs noted.  She admits to taking Eliquis routinely as her CHADsVASc score is 5.  Advised to increase flecainide to 100 mg twice a day.  Continue magnesium supplement.  A lab order given to recheck magnesium level as well as complete metabolic panel, TSH and blood count.  She will return to the office in 2 days for repeat EKG.    Hypertension-blood pressure increased.  Advised to add back ARB.  She is hesitant to restart losartan therefore a prescription for Diovan 40 mg once a day sent to her local pharmacy.  Blood pressure check will be done in 2 days along with EKG.  If she tolerates Diovan well then prescription can be sent 90 days to mail order.    Patient's Body mass index is 27.86 kg/m². BMI is above normal parameters. Recommendations include: nutrition counseling.  Weight is similar to prior visit.  Encouraged heart healthy diet.    Hypercholesterolemia- currently on TriCor without  issues noted.  Continue same.  Please forward copy of her most recent lipid panel.    Pulmonary hypertension/cardiac murmur- most recent echo in March of last year showed moderate MR and moderate TR.  PA pressure was 45 mmHg slightly improved from 50 mg meters of mercury in 2018. At next visit consider repeat echocardiogram unless done sooner.      A 3 month follow up visit scheduled. Please call sooner for cardiac concerns.            Electronically signed by SANDRA Wilder,  February 3, 2020 5:09 PM

## 2020-02-05 ENCOUNTER — TELEPHONE (OUTPATIENT)
Dept: CARDIOLOGY | Facility: CLINIC | Age: 85
End: 2020-02-05

## 2020-02-05 ENCOUNTER — CLINICAL SUPPORT (OUTPATIENT)
Dept: CARDIOLOGY | Facility: CLINIC | Age: 85
End: 2020-02-05

## 2020-02-05 VITALS — HEART RATE: 72 BPM | SYSTOLIC BLOOD PRESSURE: 140 MMHG | DIASTOLIC BLOOD PRESSURE: 78 MMHG

## 2020-02-05 DIAGNOSIS — I44.0 FIRST DEGREE AV BLOCK: ICD-10-CM

## 2020-02-05 DIAGNOSIS — Z79.899 LONG TERM CURRENT USE OF ANTIARRHYTHMIC DRUG: ICD-10-CM

## 2020-02-05 DIAGNOSIS — I48.0 PAF (PAROXYSMAL ATRIAL FIBRILLATION) (HCC): Primary | ICD-10-CM

## 2020-02-05 PROCEDURE — 93000 ELECTROCARDIOGRAM COMPLETE: CPT | Performed by: NURSE PRACTITIONER

## 2020-02-05 NOTE — TELEPHONE ENCOUNTER
I spoke with patient about results  of EKG and recommendations to continue Diovan and to rake potassium when takes Lasix She is aware to take Flecainide 1 tablet and 1/2  Tablet in evening . She is advised  To contact  Urologists about Mybetriq and to monitor BP and HR. She verbalizes understanding

## 2020-02-05 NOTE — PROGRESS NOTES
Procedure     ECG 12 Lead  Date/Time: 2/5/2020 10:47 AM  Performed by: Wanda Felix APRN  Authorized by: Wanda Felix APRN   Comparison: compared with previous ECG from 1/3/2020  Comparison to previous ECG: Atrial fib  Rhythm: sinus rhythm  BPM: 72  Conduction: 1st degree AV block

## 2020-02-05 NOTE — TELEPHONE ENCOUNTER
Please inform BP looks better 140/78. Continue Diovan. Recent labs showed normal chemistry. I would advise to only take potassium when takes lasix.   Her AR interval prolonged. Advise to go back to flecainide 1 tablet in morning and 1/2 tablet at night.   If urologist feels bp controlled enough to add medication can try taking for urinary incontinence. It can cause increased BP and tachycardia so would need to monitor vital signs.

## 2020-03-24 ENCOUNTER — TELEPHONE (OUTPATIENT)
Dept: CARDIOLOGY | Facility: CLINIC | Age: 85
End: 2020-03-24

## 2020-03-24 NOTE — TELEPHONE ENCOUNTER
Her follow up EKG in February showed increased QTc. We decreased Flecainide back to 1 tablet in morning and 1/2 tablet at night. Her last BP reading was good. If she was not taking the Diovan at that time, then I would not advise to start. Thank you.

## 2020-03-24 NOTE — TELEPHONE ENCOUNTER
Patient aware to take flecainide 1 tablet in AM and 1/2 tablet in PM. She is made aware if she has not taken Diovan and BP is fine not to start Diovan at this time , but to call if BP goes up. She verbalizes understanding .

## 2020-03-24 NOTE — TELEPHONE ENCOUNTER
Patient called in to clarify how she is to take flecainide ?  And to clarify if she is to take Diovan, she has not picked up medication from pharmacy at this time so has not been taking   . Has reported she saw her PCP and that her BP is running ok at this time .

## 2020-06-25 RX ORDER — FLECAINIDE ACETATE 100 MG/1
TABLET ORAL
Qty: 135 TABLET | Refills: 3 | Status: SHIPPED | OUTPATIENT
Start: 2020-06-25

## 2020-07-20 ENCOUNTER — OFFICE VISIT (OUTPATIENT)
Dept: CARDIOLOGY | Facility: CLINIC | Age: 85
End: 2020-07-20

## 2020-07-20 VITALS
HEART RATE: 65 BPM | HEIGHT: 65 IN | BODY MASS INDEX: 26.69 KG/M2 | SYSTOLIC BLOOD PRESSURE: 128 MMHG | DIASTOLIC BLOOD PRESSURE: 70 MMHG | WEIGHT: 160.2 LBS | TEMPERATURE: 98.7 F

## 2020-07-20 DIAGNOSIS — Z79.899 LONG TERM CURRENT USE OF ANTIARRHYTHMIC DRUG: ICD-10-CM

## 2020-07-20 DIAGNOSIS — R01.1 MURMUR, CARDIAC: ICD-10-CM

## 2020-07-20 DIAGNOSIS — R06.02 SHORTNESS OF BREATH: ICD-10-CM

## 2020-07-20 DIAGNOSIS — E78.2 MIXED HYPERLIPIDEMIA: ICD-10-CM

## 2020-07-20 DIAGNOSIS — I48.0 PAF (PAROXYSMAL ATRIAL FIBRILLATION) (HCC): ICD-10-CM

## 2020-07-20 DIAGNOSIS — I44.0 FIRST DEGREE AV BLOCK: ICD-10-CM

## 2020-07-20 DIAGNOSIS — I34.0 NON-RHEUMATIC MITRAL REGURGITATION: ICD-10-CM

## 2020-07-20 DIAGNOSIS — R53.83 OTHER FATIGUE: ICD-10-CM

## 2020-07-20 DIAGNOSIS — Z79.01 CURRENT USE OF LONG TERM ANTICOAGULATION: ICD-10-CM

## 2020-07-20 DIAGNOSIS — I10 ESSENTIAL HYPERTENSION: ICD-10-CM

## 2020-07-20 PROCEDURE — 99214 OFFICE O/P EST MOD 30 MIN: CPT | Performed by: NURSE PRACTITIONER

## 2020-07-20 PROCEDURE — 93000 ELECTROCARDIOGRAM COMPLETE: CPT | Performed by: NURSE PRACTITIONER

## 2020-07-20 RX ORDER — VALSARTAN 40 MG/1
40 TABLET ORAL DAILY
Qty: 90 TABLET | Refills: 3 | Status: SHIPPED | OUTPATIENT
Start: 2020-07-20

## 2020-07-20 NOTE — PROGRESS NOTES
Chief Complaint   Patient presents with   • Follow-up     cardiac management.   Has no cardiac complaints    • Shortness of Breath     With exertion   • Med Refill     Needs refills on Valsartan  [facility]       Subjective       Danay Connolly is a 86 y.o. female with a history of palpitations and PAF. She had COCO and cardioversion for PAF, maintained on anticoagulation therapy and tambocor.  At her January 2017 office visit her EKG showed sinus tach for which Cozaar was decreased and Cardizem was increased. A Holter monitor ordered and report showed frequent PVCs. Stress test showed no ischemic burden and good LV function. Echo showed RSVP had improved to 40-45 mmHg.  In March, she underwent  surgical procedure. After procedure Cardiologist at [facility] added Metoprolol for increased heart rate. On 5/3/18 repeat stress was negative for ischemia. Echo showed moderate to severe MR. On 6/7/18 a cardiac cath found non critical disease of her mid LAD and moderate to severe MR. Due to being more symptomatic she was referred to Dr. Springer. With medication management her symptoms improved. Given she has preserved LVEF, she did not meet surgical intervention criteria. In April 2018, overnight oxygen mildly abnormal, supplemental night time oxygen ordered. At her 11/19/18 office visit, EKG showed increase in OK and QTc. Flecainide dose decreased and magnesium prescribed. On 11/26/18, echocardiogram showed LVEF 55-60%, moderate TR and PA pressure 50 mmHg. Her MR remains moderate. At her January 2019 visit, Losartan was stopped and Lasix changed to prn.On 3/8/2019 she underwent echocardiogram at [facility] that showed LVEF around 60% and moderate MR.  RVSP was 45 mmHg. IN July 2019 she underwent partial knee replacement surgery wihtout issues.     At last visit she had stopped Losartan due to recall. Her blood pressure increased and ARB, in form of Valsartan restarted. Today she comes to the office for a  follow up visit.  Her blood pressure has remained controlled.  She admits to fatigue and shortness of breath. No chest pain noted. She admits to occasional palpitations, but no significant change in symptoms reported. Due to covid issues she had stopped physical therapy which had been helping with balance. She admits to issues with loss of balance but has not had any falls. She continues her normal daily activities including light house work. No recent change in cardiac medications noted.     HPI     Cardiac History:    Past Surgical History:   Procedure Laterality Date   • BREAST CYST ASPIRATION     • CARDIAC CATHETERIZATION  06/07/2018    40% Mid LAD, Mod- Sig MR. PA- 50/21 mmHg.   • CARDIOVASCULAR STRESS TEST  07/2011    Stress- 3 min, >100% THR. BP- 180/76. Negative   • CARDIOVASCULAR STRESS TEST  02/06/2017    L. Cardiolite- Negative   • CARDIOVASCULAR STRESS TEST  05/03/2018    L. Cardiolite- Breast attenuation.   • COLONOSCOPY      wilth polypectomy   • CONVERTED (HISTORICAL) HOLTER  06/27/2011     Holter- (Dr. Garrett)  AVG HR 73 BPM. Mul Artifacts. Few PAC's   • CONVERTED (HISTORICAL) HOLTER  04/30/2014    Holter- (Dr. Garrett) A-Fib at AVG  BPM.   • CONVERTED (HISTORICAL) HOLTER  01/29/2018    AVG  BPM. Freq PVC's 12.5%. Mul PAF   • ECHO - CONVERTED  08/27/1997    Echo- EF 50%. R/O Biscupid AV   • ECHO - CONVERTED  07/2011    Echo- EF 65%. Anterior WMA. RVSP- 53mmHg   • ECHO - CONVERTED  02/06/2017    EF 65%, no AS, mild MR, RSVP 40 mmHg   • ECHO - CONVERTED  05/03/2018    EF 60%. Mod-sev MR. LA.4.8. RVSP-45 mmHg   • ECHO - CONVERTED  11/26/2018    EF 60%. Mod MR. RVSP- 50 mmHg.   • ECHO - CONVERTED  03/08/2019    [facility]- TLS, LVEF 60%, moderate MR, moderate TR, RVSP 45 mmHg.    • HYSTERECTOMY     • MOUTH SURGERY     • PELVIC FLOOR REPAIR     • STEROID INJECTION KNEE Left    • COCO  05/14/2014    COCO and Cardioversion- No thrombus, converted to sinus with 200 J.       Current Outpatient Medications    Medication Sig Dispense Refill   • albuterol (PROVENTIL HFA;VENTOLIN HFA) 108 (90 BASE) MCG/ACT inhaler Inhale 2 puffs every 4 (four) hours as needed for wheezing.     • apixaban (ELIQUIS) 5 MG tablet tablet Take 5 mg by mouth 2 (two) times a day.     • aspirin 81 MG EC tablet Take 81 mg by mouth daily.     • CARTIA  MG 24 hr capsule TAKE 1 CAPSULE DAILY 90 capsule 4   • Cholecalciferol (VITAMIN D3) 2000 units tablet Take 1 tablet by mouth Daily.     • Cyanocobalamin (VITAMIN B12) 1000 MCG tablet controlled-release Take 1 tablet by mouth Daily.     • fenofibrate (TRICOR) 145 MG tablet Take 145 mg by mouth every night.     • flecainide (TAMBOCOR) 100 MG tablet TAKE 1 TABLET IN THE MORNING AND ONE-HALF (1/2) TABLET AT NIGHT AS DIRECTED 135 tablet 3   • FLUoxetine (PROzac) 20 MG capsule Take 20 mg by mouth every night.     • furosemide (LASIX) 40 MG tablet Take 20 mg by mouth Every Morning. Take 1/2 tablet along with potassium as needed for increased BP, swelling or shortness of breath     • magnesium oxide (MAGOX) 400 (241.3 Mg) MG tablet tablet Take 1 tablet by mouth Daily. 90 tablet 3   • meclizine (ANTIVERT) 12.5 MG tablet Take 12.5 mg by mouth 3 (Three) Times a Day As Needed for dizziness (takes only when episode of dizziness occur).     • Multiple Vitamin (MULTI VITAMIN DAILY PO) Take 1 tablet by mouth daily.     • potassium chloride (MICRO-K) 10 MEQ CR capsule Take 10 mEq by mouth Daily.     • tiotropium (SPIRIVA) 18 MCG per inhalation capsule Place 1 capsule into inhaler and inhale 1 (one) time daily.     • valsartan (DIOVAN) 40 MG tablet Take 1 tablet by mouth Daily. 90 tablet 3     No current facility-administered medications for this visit.        Patient has no known allergies.    Past Medical History:   Diagnosis Date   • Arthritis    • Bladder prolapse, female, acquired    • Breast cyst     removed    • GERD (gastroesophageal reflux disease)    • H/O oral surgery    • H/O: hysterectomy    • Hx of  "colonoscopy with polypectomy    • Hypercholesterolemia    • Hyperlipidemia    • Hypertension    • Osteoporosis    • Seasonal allergies        Social History     Socioeconomic History   • Marital status:      Spouse name: Not on file   • Number of children: 1   • Years of education: Not on file   • Highest education level: Not on file   Occupational History   • Occupation:      Comment: retired     Comment: Fourth grade   Tobacco Use   • Smoking status: Never Smoker   • Smokeless tobacco: Never Used   Substance and Sexual Activity   • Alcohol use: No     Comment: Occasional    • Drug use: No   • Sexual activity: Defer   Social History Narrative    The patient lives alone in [place].       Family History   Problem Relation Age of Onset   • Stroke Mother    • Heart attack Father    • Hypertension Son        Review of Systems   Constitution: Negative for decreased appetite, diaphoresis and fever.   HENT: Negative for congestion and nosebleeds.    Eyes: Negative for blurred vision (Wears glasses for correction of blurred vision) and redness.   Cardiovascular: Positive for palpitations (\"sometimes, not often\"). Negative for chest pain, leg swelling and near-syncope.   Respiratory: Positive for sleep disturbances due to breathing (uses oxygen at night).    Endocrine: Negative for polydipsia, polyphagia and polyuria.   Hematologic/Lymphatic: Negative for bleeding problem. Does not bruise/bleed easily.   Skin: Positive for skin cancer. Negative for poor wound healing.   Musculoskeletal: Positive for joint pain (Not a new problem). Negative for falls, muscle cramps and muscle weakness.   Gastrointestinal: Negative for abdominal pain, dysphagia, heartburn, melena and nausea.   Genitourinary: Negative for dysuria and hematuria.   Neurological: Positive for loss of balance. Negative for headaches.   Psychiatric/Behavioral: Negative.    Allergic/Immunologic: Negative.         Objective     /70 (BP " "Location: Left arm)   Pulse 65   Temp 98.7 °F (37.1 °C)   Ht 165.1 cm (65\")   Wt 72.7 kg (160 lb 3.2 oz)   BMI 26.66 kg/m²     Physical Exam   Constitutional: She is oriented to person, place, and time. Vital signs are normal. She appears well-nourished.   HENT:   Head: Normocephalic.   Eyes: Pupils are equal, round, and reactive to light. Conjunctivae are normal.   Neck: Normal range of motion. Neck supple. Carotid bruit is not present.   Cardiovascular: Normal rate, regular rhythm, S1 normal and S2 normal.   No murmur heard.  Pulmonary/Chest: Effort normal. She has rales (coarse in lower lobes).   Abdominal: Soft. Bowel sounds are normal. There is no tenderness.   Musculoskeletal: Normal range of motion. She exhibits no edema or tenderness.   Neurological: She is alert and oriented to person, place, and time.   Skin: Skin is warm. No pallor.   Psychiatric: She has a normal mood and affect. Her speech is normal and behavior is normal. Cognition and memory are normal.          ECG 12 Lead  Date/Time: 7/20/2020 12:09 PM  Performed by: Wanda Felix APRN  Authorized by: Wanda Felix APRN   Comparison: compared with previous ECG from 2/3/2020  Comparison to previous ECG: Atrial fib with PVC noted.   Rhythm: sinus rhythm  BPM: 65  Conduction: 1st degree AV block                Assessment/Plan      Danay was seen today for follow-up, shortness of breath and med refill.    Diagnoses and all orders for this visit:    PAF (paroxysmal atrial fibrillation) (CMS/[facility])  -     ECG 12 Lead    First degree AV block    Current use of long term anticoagulation    Long term current use of antiarrhythmic drug  -     ECG 12 Lead    Non-rheumatic mitral regurgitation  -     Adult Transthoracic Echo Complete W/ Cont if Necessary Per Protocol; Future    Murmur, cardiac  -     Adult Transthoracic Echo Complete W/ Cont if Necessary Per Protocol; Future    Essential hypertension  -     valsartan (DIOVAN) 40 MG tablet; Take 1 " tablet by mouth Daily.    Mixed hyperlipidemia    Other fatigue  -     Adult Transthoracic Echo Complete W/ Cont if Necessary Per Protocol; Future    Shortness of breath  -     Adult Transthoracic Echo Complete W/ Cont if Necessary Per Protocol; Future         PAF/medication management- EKG today shows sinus with first degree AV block.  ms.  Advised to continue flecainide 100 mg in the morning and 50 mg at night.  Her CHADsVASc score is 5. No signs of bleeding noted.  Continue Eliquis for stroke prophylaxis.  Continue magnesium supplement.  She is also on daily low-dose aspirin without GI complaints or increased bruising.  We will continue the same at this time.    To reassess MR as she admits to more fatigue and shortness of breath a repeat echocardiogram ordered.  Further recommendations based on results.      Her blood pressure today is normal.  Continue Cartia  mg daily, Lasix 20 mg daily, and valsartan 40 mg daily.    She plans to see you in the near future for lab orders and follow up visit. Please forward a copy of lab results when available. Currently she is taking Tricor for lipid management without side effects noted.     Patient's Body mass index is 26.66 kg/m². BMI is above normal parameters. Recommendations include: nutrition counseling. Her weight is down 7 pounds from last year.  She reports eating 3 meals a day but with her evening meal she has significantly decreased caloric intake.  I encouraged her diet efforts.  She also has an exercise bike at home which I encouraged her to use to maintain leg strength.  If she continues to have issues with loss of balance she may need to resume physical therapy as she found beneficial in the past. I advised her to further discuss with you.     Further recommendations based on echocardiogram report.  Otherwise, we will see her back in 6 months.  Please call sooner for cardiac concerns.             Electronically signed by SANDRA Wilder,  July  21, 2020 07:29

## 2020-07-27 ENCOUNTER — APPOINTMENT (OUTPATIENT)
Dept: CARDIOLOGY | Facility: HOSPITAL | Age: 85
End: 2020-07-27

## 2020-07-30 ENCOUNTER — TELEPHONE (OUTPATIENT)
Dept: CARDIOLOGY | Facility: CLINIC | Age: 85
End: 2020-07-30

## 2020-08-28 ENCOUNTER — HOSPITAL ENCOUNTER (OUTPATIENT)
Dept: CARDIOLOGY | Facility: HOSPITAL | Age: 85
Discharge: HOME | End: 2020-08-28
Admitting: NURSE PRACTITIONER

## 2020-08-28 VITALS — WEIGHT: 160.27 LBS | BODY MASS INDEX: 26.7 KG/M2 | HEIGHT: 65 IN

## 2020-08-28 DIAGNOSIS — R01.1 MURMUR, CARDIAC: ICD-10-CM

## 2020-08-28 DIAGNOSIS — R06.02 SHORTNESS OF BREATH: ICD-10-CM

## 2020-08-28 DIAGNOSIS — I34.0 NON-RHEUMATIC MITRAL REGURGITATION: ICD-10-CM

## 2020-08-28 DIAGNOSIS — R53.83 OTHER FATIGUE: ICD-10-CM

## 2020-08-28 PROCEDURE — 93306 TTE W/DOPPLER COMPLETE: CPT

## 2020-08-28 PROCEDURE — 93306 TTE W/DOPPLER COMPLETE: CPT | Performed by: INTERNAL MEDICINE

## 2020-08-30 LAB
BH CV ECHO MEAS - ACS: 2 CM
BH CV ECHO MEAS - AO MAX PG (FULL): 3.4 MMHG
BH CV ECHO MEAS - AO MAX PG: 5.7 MMHG
BH CV ECHO MEAS - AO MEAN PG (FULL): 2 MMHG
BH CV ECHO MEAS - AO MEAN PG: 3 MMHG
BH CV ECHO MEAS - AO ROOT AREA (BSA CORRECTED): 1.7
BH CV ECHO MEAS - AO ROOT AREA: 7.5 CM^2
BH CV ECHO MEAS - AO ROOT DIAM: 3.1 CM
BH CV ECHO MEAS - AO V2 MAX: 119 CM/SEC
BH CV ECHO MEAS - AO V2 MEAN: 79.9 CM/SEC
BH CV ECHO MEAS - AO V2 VTI: 25.9 CM
BH CV ECHO MEAS - BSA(HAYCOCK): 1.8 M^2
BH CV ECHO MEAS - BSA: 1.8 M^2
BH CV ECHO MEAS - BZI_BMI: 26.6 KILOGRAMS/M^2
BH CV ECHO MEAS - BZI_METRIC_HEIGHT: 165.1 CM
BH CV ECHO MEAS - BZI_METRIC_WEIGHT: 72.6 KG
BH CV ECHO MEAS - EDV(CUBED): 93 ML
BH CV ECHO MEAS - EDV(TEICH): 93.9 ML
BH CV ECHO MEAS - EF(CUBED): 65.7 %
BH CV ECHO MEAS - EF(TEICH): 57.4 %
BH CV ECHO MEAS - ESV(CUBED): 31.9 ML
BH CV ECHO MEAS - ESV(TEICH): 40 ML
BH CV ECHO MEAS - FS: 30 %
BH CV ECHO MEAS - IVS/LVPW: 1
BH CV ECHO MEAS - IVSD: 1 CM
BH CV ECHO MEAS - LA A2CS (ATRIAL LENGTH): 6.2 CM
BH CV ECHO MEAS - LA DIMENSION(2D): 5.1 CM
BH CV ECHO MEAS - LA DIMENSION: 5.1 CM
BH CV ECHO MEAS - LA/AO: 1.6
BH CV ECHO MEAS - LAT PEAK E' VEL: 5.8 CM/SEC
BH CV ECHO MEAS - LV IVRT: 0.12 SEC
BH CV ECHO MEAS - LV MASS(C)D: 156.3 GRAMS
BH CV ECHO MEAS - LV MASS(C)DI: 86.9 GRAMS/M^2
BH CV ECHO MEAS - LV MAX PG: 2.3 MMHG
BH CV ECHO MEAS - LV MEAN PG: 1 MMHG
BH CV ECHO MEAS - LV V1 MAX: 75.1 CM/SEC
BH CV ECHO MEAS - LV V1 MEAN: 42.9 CM/SEC
BH CV ECHO MEAS - LV V1 VTI: 15.8 CM
BH CV ECHO MEAS - LVIDD: 4.5 CM
BH CV ECHO MEAS - LVIDS: 3.2 CM
BH CV ECHO MEAS - LVPWD: 0.99 CM
BH CV ECHO MEAS - MED PEAK E' VEL: 6.1 CM/SEC
BH CV ECHO MEAS - MR MAX PG: 38.9 MMHG
BH CV ECHO MEAS - MR MAX VEL: 312 CM/SEC
BH CV ECHO MEAS - MV A MAX VEL: 54.3 CM/SEC
BH CV ECHO MEAS - MV DEC TIME: 0.21 SEC
BH CV ECHO MEAS - MV E MAX VEL: 102 CM/SEC
BH CV ECHO MEAS - MV E/A: 1.9
BH CV ECHO MEAS - MV MAX PG: 3.3 MMHG
BH CV ECHO MEAS - MV MEAN PG: 1 MMHG
BH CV ECHO MEAS - MV V2 MAX: 90.4 CM/SEC
BH CV ECHO MEAS - MV V2 MEAN: 42.4 CM/SEC
BH CV ECHO MEAS - MV V2 VTI: 26.8 CM
BH CV ECHO MEAS - PA MAX PG (FULL): 1.6 MMHG
BH CV ECHO MEAS - PA MAX PG: 6.6 MMHG
BH CV ECHO MEAS - PA MEAN PG (FULL): 2 MMHG
BH CV ECHO MEAS - PA MEAN PG: 4 MMHG
BH CV ECHO MEAS - PA V2 MAX: 128 CM/SEC
BH CV ECHO MEAS - PA V2 MEAN: 88.2 CM/SEC
BH CV ECHO MEAS - PA V2 VTI: 24.4 CM
BH CV ECHO MEAS - RAP SYSTOLE: 10 MMHG
BH CV ECHO MEAS - RV MAX PG: 4.9 MMHG
BH CV ECHO MEAS - RV MEAN PG: 2 MMHG
BH CV ECHO MEAS - RV V1 MAX: 111 CM/SEC
BH CV ECHO MEAS - RV V1 MEAN: 67.7 CM/SEC
BH CV ECHO MEAS - RV V1 VTI: 20.6 CM
BH CV ECHO MEAS - RVDD: 3.1 CM
BH CV ECHO MEAS - RVSP: 54 MMHG
BH CV ECHO MEAS - SI(AO): 108.7 ML/M^2
BH CV ECHO MEAS - SI(CUBED): 34 ML/M^2
BH CV ECHO MEAS - SI(TEICH): 29.9 ML/M^2
BH CV ECHO MEAS - SV(AO): 195.5 ML
BH CV ECHO MEAS - SV(CUBED): 61.1 ML
BH CV ECHO MEAS - SV(TEICH): 53.9 ML
BH CV ECHO MEAS - TR MAX VEL: 329 CM/SEC
BH CV ECHO MEASUREMENTS AVERAGE E/E' RATIO: 17.14
LEFT ATRIUM VOLUME INDEX: 71 ML/M2
LEFT ATRIUM VOLUME: 129 CM3
MAXIMAL PREDICTED HEART RATE: 134 BPM
STRESS TARGET HR: 114 BPM

## 2020-12-18 ENCOUNTER — TELEPHONE (OUTPATIENT)
Dept: CARDIOLOGY | Facility: CLINIC | Age: 85
End: 2020-12-18

## 2020-12-18 NOTE — TELEPHONE ENCOUNTER
Received fax from Dr. Ab Xie for cardiac clearance for patient to have a lumbar 5 kyphoplasty. Patient is on aspirin and Eliquis. According to our records, I do not see where patient has had any stenting. Patient has a history of PAF.       Fax [phone]

## 2021-07-13 ENCOUNTER — HOSPITAL ENCOUNTER (OUTPATIENT)
Facility: HOSPITAL | Age: 86
Setting detail: OBSERVATION
Discharge: HOME | End: 2021-07-15
Attending: EMERGENCY MEDICINE | Admitting: INTERNAL MEDICINE

## 2021-07-13 ENCOUNTER — APPOINTMENT (OUTPATIENT)
Dept: CT IMAGING | Facility: HOSPITAL | Age: 86
End: 2021-07-13

## 2021-07-13 ENCOUNTER — APPOINTMENT (OUTPATIENT)
Dept: GENERAL RADIOLOGY | Facility: HOSPITAL | Age: 86
End: 2021-07-13

## 2021-07-13 DIAGNOSIS — R06.02 SHORTNESS OF BREATH: ICD-10-CM

## 2021-07-13 DIAGNOSIS — Z78.9 FAILURE OF OUTPATIENT TREATMENT: ICD-10-CM

## 2021-07-13 DIAGNOSIS — J18.9 PNEUMONIA OF BOTH LOWER LOBES DUE TO INFECTIOUS ORGANISM: Primary | ICD-10-CM

## 2021-07-13 PROBLEM — J44.1 COPD WITH ACUTE EXACERBATION (HCC): Status: ACTIVE | Noted: 2021-07-13

## 2021-07-13 LAB
ALBUMIN SERPL-MCNC: 4.2 G/DL (ref 3.5–5.2)
ALBUMIN/GLOB SERPL: 1.4 G/DL
ALP SERPL-CCNC: 70 U/L (ref 39–117)
ALT SERPL W P-5'-P-CCNC: 10 U/L (ref 1–33)
ANION GAP SERPL CALCULATED.3IONS-SCNC: 12 MMOL/L (ref 5–15)
AST SERPL-CCNC: 20 U/L (ref 1–32)
B PARAPERT DNA SPEC QL NAA+PROBE: NOT DETECTED
B PERT DNA SPEC QL NAA+PROBE: NOT DETECTED
BASOPHILS # BLD AUTO: 0.05 10*3/MM3 (ref 0–0.2)
BASOPHILS NFR BLD AUTO: 0.6 % (ref 0–1.5)
BILIRUB SERPL-MCNC: 0.3 MG/DL (ref 0–1.2)
BUN SERPL-MCNC: 20 MG/DL (ref 8–23)
BUN/CREAT SERPL: 20.4 (ref 7–25)
C PNEUM DNA NPH QL NAA+NON-PROBE: NOT DETECTED
CALCIUM SPEC-SCNC: 9.8 MG/DL (ref 8.6–10.5)
CHLORIDE SERPL-SCNC: 102 MMOL/L (ref 98–107)
CO2 SERPL-SCNC: 24 MMOL/L (ref 22–29)
CREAT SERPL-MCNC: 0.98 MG/DL (ref 0.57–1)
D-LACTATE SERPL-SCNC: 1.3 MMOL/L (ref 0.5–2)
DEPRECATED RDW RBC AUTO: 46.7 FL (ref 37–54)
EOSINOPHIL # BLD AUTO: 0.15 10*3/MM3 (ref 0–0.4)
EOSINOPHIL NFR BLD AUTO: 1.9 % (ref 0.3–6.2)
ERYTHROCYTE [DISTWIDTH] IN BLOOD BY AUTOMATED COUNT: 13.3 % (ref 12.3–15.4)
FLUAV SUBTYP SPEC NAA+PROBE: NOT DETECTED
FLUBV RNA ISLT QL NAA+PROBE: NOT DETECTED
GFR SERPL CREATININE-BSD FRML MDRD: 54 ML/MIN/1.73
GLOBULIN UR ELPH-MCNC: 2.9 GM/DL
GLUCOSE SERPL-MCNC: 101 MG/DL (ref 65–99)
HADV DNA SPEC NAA+PROBE: NOT DETECTED
HCOV 229E RNA SPEC QL NAA+PROBE: NOT DETECTED
HCOV HKU1 RNA SPEC QL NAA+PROBE: NOT DETECTED
HCOV NL63 RNA SPEC QL NAA+PROBE: NOT DETECTED
HCOV OC43 RNA SPEC QL NAA+PROBE: NOT DETECTED
HCT VFR BLD AUTO: 42.4 % (ref 34–46.6)
HGB BLD-MCNC: 13.3 G/DL (ref 12–15.9)
HMPV RNA NPH QL NAA+NON-PROBE: NOT DETECTED
HOLD SPECIMEN: NORMAL
HPIV1 RNA SPEC QL NAA+PROBE: NOT DETECTED
HPIV2 RNA SPEC QL NAA+PROBE: NOT DETECTED
HPIV3 RNA NPH QL NAA+PROBE: NOT DETECTED
HPIV4 P GENE NPH QL NAA+PROBE: NOT DETECTED
IMM GRANULOCYTES # BLD AUTO: 0.14 10*3/MM3 (ref 0–0.05)
IMM GRANULOCYTES NFR BLD AUTO: 1.8 % (ref 0–0.5)
LYMPHOCYTES # BLD AUTO: 1.51 10*3/MM3 (ref 0.7–3.1)
LYMPHOCYTES NFR BLD AUTO: 19.3 % (ref 19.6–45.3)
M PNEUMO IGG SER IA-ACNC: NOT DETECTED
MCH RBC QN AUTO: 30 PG (ref 26.6–33)
MCHC RBC AUTO-ENTMCNC: 31.4 G/DL (ref 31.5–35.7)
MCV RBC AUTO: 95.5 FL (ref 79–97)
MONOCYTES # BLD AUTO: 0.7 10*3/MM3 (ref 0.1–0.9)
MONOCYTES NFR BLD AUTO: 9 % (ref 5–12)
NEUTROPHILS NFR BLD AUTO: 5.26 10*3/MM3 (ref 1.7–7)
NEUTROPHILS NFR BLD AUTO: 67.4 % (ref 42.7–76)
NRBC BLD AUTO-RTO: 0 /100 WBC (ref 0–0.2)
NT-PROBNP SERPL-MCNC: 577.4 PG/ML (ref 0–1800)
PLATELET # BLD AUTO: 347 10*3/MM3 (ref 140–450)
PMV BLD AUTO: 9.2 FL (ref 6–12)
POTASSIUM SERPL-SCNC: 4.1 MMOL/L (ref 3.5–5.2)
PROCALCITONIN SERPL-MCNC: 0.06 NG/ML (ref 0–0.25)
PROT SERPL-MCNC: 7.1 G/DL (ref 6–8.5)
RBC # BLD AUTO: 4.44 10*6/MM3 (ref 3.77–5.28)
RHINOVIRUS RNA SPEC NAA+PROBE: NOT DETECTED
RSV RNA NPH QL NAA+NON-PROBE: NOT DETECTED
SARS-COV-2 RNA NPH QL NAA+NON-PROBE: NOT DETECTED
SODIUM SERPL-SCNC: 138 MMOL/L (ref 136–145)
TROPONIN T SERPL-MCNC: <0.01 NG/ML (ref 0–0.03)
WBC # BLD AUTO: 7.81 10*3/MM3 (ref 3.4–10.8)
WHOLE BLOOD HOLD SPECIMEN: NORMAL

## 2021-07-13 PROCEDURE — 71275 CT ANGIOGRAPHY CHEST: CPT

## 2021-07-13 PROCEDURE — 71045 X-RAY EXAM CHEST 1 VIEW: CPT

## 2021-07-13 PROCEDURE — 96365 THER/PROPH/DIAG IV INF INIT: CPT

## 2021-07-13 PROCEDURE — 99220 PR INITIAL OBSERVATION CARE/DAY 70 MINUTES: CPT | Performed by: INTERNAL MEDICINE

## 2021-07-13 PROCEDURE — G0378 HOSPITAL OBSERVATION PER HR: HCPCS

## 2021-07-13 PROCEDURE — 93005 ELECTROCARDIOGRAM TRACING: CPT

## 2021-07-13 PROCEDURE — 84145 PROCALCITONIN (PCT): CPT | Performed by: INTERNAL MEDICINE

## 2021-07-13 PROCEDURE — 96375 TX/PRO/DX INJ NEW DRUG ADDON: CPT

## 2021-07-13 PROCEDURE — 0 IOPAMIDOL PER 1 ML: Performed by: EMERGENCY MEDICINE

## 2021-07-13 PROCEDURE — 85025 COMPLETE CBC W/AUTO DIFF WBC: CPT | Performed by: EMERGENCY MEDICINE

## 2021-07-13 PROCEDURE — 87040 BLOOD CULTURE FOR BACTERIA: CPT | Performed by: PHYSICIAN ASSISTANT

## 2021-07-13 PROCEDURE — 80053 COMPREHEN METABOLIC PANEL: CPT | Performed by: EMERGENCY MEDICINE

## 2021-07-13 PROCEDURE — 25010000002 METHYLPREDNISOLONE PER 125 MG: Performed by: PHYSICIAN ASSISTANT

## 2021-07-13 PROCEDURE — 99284 EMERGENCY DEPT VISIT MOD MDM: CPT

## 2021-07-13 PROCEDURE — 84484 ASSAY OF TROPONIN QUANT: CPT | Performed by: EMERGENCY MEDICINE

## 2021-07-13 PROCEDURE — 94799 UNLISTED PULMONARY SVC/PX: CPT

## 2021-07-13 PROCEDURE — 96367 TX/PROPH/DG ADDL SEQ IV INF: CPT

## 2021-07-13 PROCEDURE — 83880 ASSAY OF NATRIURETIC PEPTIDE: CPT | Performed by: EMERGENCY MEDICINE

## 2021-07-13 PROCEDURE — 83605 ASSAY OF LACTIC ACID: CPT | Performed by: PHYSICIAN ASSISTANT

## 2021-07-13 PROCEDURE — 94640 AIRWAY INHALATION TREATMENT: CPT

## 2021-07-13 PROCEDURE — 93005 ELECTROCARDIOGRAM TRACING: CPT | Performed by: EMERGENCY MEDICINE

## 2021-07-13 PROCEDURE — 25010000002 ONDANSETRON PER 1 MG

## 2021-07-13 PROCEDURE — 25010000002 CEFTRIAXONE PER 250 MG: Performed by: INTERNAL MEDICINE

## 2021-07-13 PROCEDURE — 0202U NFCT DS 22 TRGT SARS-COV-2: CPT | Performed by: PHYSICIAN ASSISTANT

## 2021-07-13 RX ORDER — FENOFIBRATE 145 MG/1
145 TABLET, COATED ORAL DAILY
Status: DISCONTINUED | OUTPATIENT
Start: 2021-07-13 | End: 2021-07-15 | Stop reason: HOSPADM

## 2021-07-13 RX ORDER — FLECAINIDE ACETATE 50 MG/1
100 TABLET ORAL EVERY MORNING
Status: DISCONTINUED | OUTPATIENT
Start: 2021-07-14 | End: 2021-07-15 | Stop reason: HOSPADM

## 2021-07-13 RX ORDER — GABAPENTIN 100 MG/1
100 CAPSULE ORAL 2 TIMES DAILY
COMMUNITY

## 2021-07-13 RX ORDER — FUROSEMIDE 20 MG/1
20 TABLET ORAL EVERY MORNING
Status: DISCONTINUED | OUTPATIENT
Start: 2021-07-14 | End: 2021-07-15 | Stop reason: HOSPADM

## 2021-07-13 RX ORDER — MECLIZINE HYDROCHLORIDE 25 MG/1
12.5 TABLET ORAL 3 TIMES DAILY PRN
Status: DISCONTINUED | OUTPATIENT
Start: 2021-07-13 | End: 2021-07-15 | Stop reason: HOSPADM

## 2021-07-13 RX ORDER — SODIUM CHLORIDE 0.9 % (FLUSH) 0.9 %
10 SYRINGE (ML) INJECTION AS NEEDED
Status: DISCONTINUED | OUTPATIENT
Start: 2021-07-13 | End: 2021-07-15 | Stop reason: HOSPADM

## 2021-07-13 RX ORDER — BISACODYL 10 MG
10 SUPPOSITORY, RECTAL RECTAL DAILY PRN
Status: DISCONTINUED | OUTPATIENT
Start: 2021-07-13 | End: 2021-07-15 | Stop reason: HOSPADM

## 2021-07-13 RX ORDER — VALSARTAN 80 MG/1
40 TABLET ORAL DAILY
Status: DISCONTINUED | OUTPATIENT
Start: 2021-07-13 | End: 2021-07-15 | Stop reason: HOSPADM

## 2021-07-13 RX ORDER — FUROSEMIDE 20 MG/1
20 TABLET ORAL DAILY
COMMUNITY

## 2021-07-13 RX ORDER — FENOFIBRATE 145 MG/1
145 TABLET, COATED ORAL NIGHTLY
COMMUNITY

## 2021-07-13 RX ORDER — ONDANSETRON 2 MG/ML
INJECTION INTRAMUSCULAR; INTRAVENOUS
Status: COMPLETED
Start: 2021-07-13 | End: 2021-07-13

## 2021-07-13 RX ORDER — POLYETHYLENE GLYCOL 3350 17 G/17G
17 POWDER, FOR SOLUTION ORAL DAILY PRN
Status: DISCONTINUED | OUTPATIENT
Start: 2021-07-13 | End: 2021-07-15 | Stop reason: HOSPADM

## 2021-07-13 RX ORDER — METHYLPREDNISOLONE SODIUM SUCCINATE 125 MG/2ML
125 INJECTION, POWDER, LYOPHILIZED, FOR SOLUTION INTRAMUSCULAR; INTRAVENOUS ONCE
Status: COMPLETED | OUTPATIENT
Start: 2021-07-13 | End: 2021-07-13

## 2021-07-13 RX ORDER — FLUOXETINE HYDROCHLORIDE 20 MG/1
20 CAPSULE ORAL NIGHTLY
Status: DISCONTINUED | OUTPATIENT
Start: 2021-07-13 | End: 2021-07-14

## 2021-07-13 RX ORDER — IPRATROPIUM BROMIDE AND ALBUTEROL SULFATE 2.5; .5 MG/3ML; MG/3ML
3 SOLUTION RESPIRATORY (INHALATION)
Status: DISCONTINUED | OUTPATIENT
Start: 2021-07-13 | End: 2021-07-15 | Stop reason: HOSPADM

## 2021-07-13 RX ORDER — ACETAMINOPHEN 325 MG/1
650 TABLET ORAL EVERY 4 HOURS PRN
Status: DISCONTINUED | OUTPATIENT
Start: 2021-07-13 | End: 2021-07-15 | Stop reason: HOSPADM

## 2021-07-13 RX ORDER — METHOCARBAMOL 500 MG/1
500 TABLET, FILM COATED ORAL EVERY 8 HOURS PRN
COMMUNITY

## 2021-07-13 RX ORDER — CEFTRIAXONE SODIUM 1 G/50ML
1 INJECTION, SOLUTION INTRAVENOUS EVERY 24 HOURS
Status: DISCONTINUED | OUTPATIENT
Start: 2021-07-13 | End: 2021-07-15 | Stop reason: HOSPADM

## 2021-07-13 RX ORDER — ACETAMINOPHEN 650 MG/1
650 SUPPOSITORY RECTAL EVERY 4 HOURS PRN
Status: DISCONTINUED | OUTPATIENT
Start: 2021-07-13 | End: 2021-07-15 | Stop reason: HOSPADM

## 2021-07-13 RX ORDER — DILTIAZEM HYDROCHLORIDE 300 MG/1
300 CAPSULE, COATED, EXTENDED RELEASE ORAL DAILY
COMMUNITY

## 2021-07-13 RX ORDER — ACETAMINOPHEN 160 MG/5ML
650 SOLUTION ORAL EVERY 4 HOURS PRN
Status: DISCONTINUED | OUTPATIENT
Start: 2021-07-13 | End: 2021-07-15 | Stop reason: HOSPADM

## 2021-07-13 RX ORDER — BISACODYL 5 MG/1
5 TABLET, DELAYED RELEASE ORAL DAILY PRN
Status: DISCONTINUED | OUTPATIENT
Start: 2021-07-13 | End: 2021-07-15 | Stop reason: HOSPADM

## 2021-07-13 RX ORDER — ASPIRIN 81 MG/1
81 TABLET ORAL DAILY
Status: DISCONTINUED | OUTPATIENT
Start: 2021-07-14 | End: 2021-07-15 | Stop reason: HOSPADM

## 2021-07-13 RX ORDER — PREDNISONE 20 MG/1
40 TABLET ORAL
Status: DISCONTINUED | OUTPATIENT
Start: 2021-07-14 | End: 2021-07-15 | Stop reason: HOSPADM

## 2021-07-13 RX ORDER — AMOXICILLIN 250 MG
2 CAPSULE ORAL 2 TIMES DAILY
Status: DISCONTINUED | OUTPATIENT
Start: 2021-07-13 | End: 2021-07-15 | Stop reason: HOSPADM

## 2021-07-13 RX ORDER — POLYETHYLENE GLYCOL 3350 17 G/17G
17 POWDER, FOR SOLUTION ORAL DAILY
COMMUNITY

## 2021-07-13 RX ORDER — POTASSIUM CHLORIDE 750 MG/1
10 CAPSULE, EXTENDED RELEASE ORAL DAILY
Status: DISCONTINUED | OUTPATIENT
Start: 2021-07-14 | End: 2021-07-15 | Stop reason: HOSPADM

## 2021-07-13 RX ORDER — ALBUTEROL SULFATE 90 UG/1
2 AEROSOL, METERED RESPIRATORY (INHALATION) ONCE
Status: COMPLETED | OUTPATIENT
Start: 2021-07-13 | End: 2021-07-13

## 2021-07-13 RX ORDER — SODIUM CHLORIDE 0.9 % (FLUSH) 0.9 %
10 SYRINGE (ML) INJECTION EVERY 12 HOURS SCHEDULED
Status: DISCONTINUED | OUTPATIENT
Start: 2021-07-13 | End: 2021-07-15 | Stop reason: HOSPADM

## 2021-07-13 RX ADMIN — DOXYCYCLINE 100 MG: 100 INJECTION, POWDER, LYOPHILIZED, FOR SOLUTION INTRAVENOUS at 20:30

## 2021-07-13 RX ADMIN — IOPAMIDOL 85 ML: 755 INJECTION, SOLUTION INTRAVENOUS at 14:07

## 2021-07-13 RX ADMIN — FLUOXETINE 20 MG: 20 CAPSULE ORAL at 20:30

## 2021-07-13 RX ADMIN — ONDANSETRON 4 MG: 2 INJECTION INTRAMUSCULAR; INTRAVENOUS at 17:25

## 2021-07-13 RX ADMIN — IPRATROPIUM BROMIDE AND ALBUTEROL SULFATE 3 ML: 2.5; .5 SOLUTION RESPIRATORY (INHALATION) at 19:31

## 2021-07-13 RX ADMIN — ALBUTEROL SULFATE 2 PUFF: 90 AEROSOL, METERED RESPIRATORY (INHALATION) at 16:15

## 2021-07-13 RX ADMIN — FENOFIBRATE 145 MG: 145 TABLET ORAL at 18:43

## 2021-07-13 RX ADMIN — SODIUM CHLORIDE, PRESERVATIVE FREE 10 ML: 5 INJECTION INTRAVENOUS at 20:31

## 2021-07-13 RX ADMIN — DOCUSATE SODIUM 50MG AND SENNOSIDES 8.6MG 2 TABLET: 8.6; 5 TABLET, FILM COATED ORAL at 20:30

## 2021-07-13 RX ADMIN — CEFTRIAXONE SODIUM 1 G: 1 INJECTION, SOLUTION INTRAVENOUS at 18:43

## 2021-07-13 RX ADMIN — APIXABAN 5 MG: 5 TABLET, FILM COATED ORAL at 20:30

## 2021-07-13 RX ADMIN — METHYLPREDNISOLONE SODIUM SUCCINATE 125 MG: 125 INJECTION, POWDER, FOR SOLUTION INTRAMUSCULAR; INTRAVENOUS at 17:09

## 2021-07-13 NOTE — ED PROVIDER NOTES
"Subjective   Ms. Connolly is a pleasant 86-year-old female who presents to the emergency department with complaints of cough for the past 5-1/2 weeks.  She also has some associated shortness of breath and chest \"soreness\".  The patient states that she was seen by her PCP last week and had a chest x-ray that initially was felt to represent a \"collapsed lung\".  A repeat chest x-ray was obtained and the patient was then told that she had \"holes in her lung\".  Finally, she was seen by urgent care in [place] and had another chest x-ray and was told that she had pneumonia.  She was switched from Z-Cesar to amoxicillin.  The patient continues to have shortness of breath and cough.  She has had no fever.  The patient states that she lives in a \"senior living facility\" and there has been a recent outbreak of COVID-19 there.  The patient was tested for COVID-19 this morning at [facility] and was told her results were negative.  The patient has no history of lung disease.  She does note a history of paroxysmal atrial fibrillation (on Eliquis), hypertension, GERD, hyperlipidemia and prior left hip fracture in January 2021.  She has never smoked.  No alcohol use.  Her PCP is Kirby Garrett in [place].          Review of Systems   Constitutional: Positive for fatigue. Negative for chills, diaphoresis and fever.   HENT: Negative for sore throat.    Respiratory: Positive for cough and shortness of breath.    Cardiovascular: Positive for chest pain (\"Soreness\").   Gastrointestinal: Negative for abdominal pain, diarrhea, nausea and vomiting.   Endocrine: Negative for polydipsia, polyphagia and polyuria.   Genitourinary: Negative for dysuria.   Musculoskeletal: Negative for back pain.   Skin: Negative for pallor and rash.   Allergic/Immunologic: Negative for immunocompromised state.   Neurological: Positive for weakness (Generalized). Negative for dizziness, light-headedness and headaches.   Hematological: " Bruises/bleeds easily (On Eliquis).   Psychiatric/Behavioral: Negative.        Past Medical History:   Diagnosis Date   • Arthritis    • Bladder prolapse, female, acquired    • Breast cyst     removed    • GERD (gastroesophageal reflux disease)    • H/O oral surgery    • H/O: hysterectomy    • Hx of colonoscopy with polypectomy    • Hypercholesterolemia    • Hyperlipidemia    • Hypertension    • Osteoporosis    • Seasonal allergies        No Known Allergies    Past Surgical History:   Procedure Laterality Date   • BREAST CYST ASPIRATION     • CARDIAC CATHETERIZATION  06/07/2018    40% Mid LAD, Mod- Sig MR. PA- 50/21 mmHg.   • CARDIOVASCULAR STRESS TEST  07/2011    Stress- 3 min, >100% THR. BP- 180/76. Negative   • CARDIOVASCULAR STRESS TEST  02/06/2017    L. Cardiolite- Negative   • CARDIOVASCULAR STRESS TEST  05/03/2018    L. Cardiolite- Breast attenuation.   • COLONOSCOPY      wilth polypectomy   • CONVERTED (HISTORICAL) HOLTER  06/27/2011     Holter- (Dr. Garrett)  AVG HR 73 BPM. Mul Artifacts. Few PAC's   • CONVERTED (HISTORICAL) HOLTER  04/30/2014    Holter- (Dr. Garrett) A-Fib at AVG  BPM.   • CONVERTED (HISTORICAL) HOLTER  01/29/2018    AVG  BPM. Freq PVC's 12.5%. Mul PAF   • ECHO - CONVERTED  08/27/1997    Echo- EF 50%. R/O Biscupid AV   • ECHO - CONVERTED  07/2011    Echo- EF 65%. Anterior WMA. RVSP- 53mmHg   • ECHO - CONVERTED  02/06/2017    EF 65%, no AS, mild MR, RSVP 40 mmHg   • ECHO - CONVERTED  05/03/2018    EF 60%. Mod-sev MR. LA.4.8. RVSP-45 mmHg   • ECHO - CONVERTED  11/26/2018    EF 60%. Mod MR. RVSP- 50 mmHg.   • ECHO - CONVERTED  03/08/2019    [facility]- TLS, LVEF 60%, moderate MR, moderate TR, RVSP 45 mmHg.    • ECHO - CONVERTED  08/28/2020    EF 60%. LA- 5.1 Cm. Mild MR. RVSP- 54 mmHg.   • HYSTERECTOMY     • MOUTH SURGERY     • PELVIC FLOOR REPAIR     • STEROID INJECTION KNEE Left    • COCO  05/14/2014    COCO and Cardioversion- No thrombus, converted to sinus with 200 J.       Family  History   Problem Relation Age of Onset   • Stroke Mother    • Heart attack Father    • Hypertension Son        Social History     Socioeconomic History   • Marital status:      Spouse name: Not on file   • Number of children: 1   • Years of education: Not on file   • Highest education level: Not on file   Tobacco Use   • Smoking status: Never Smoker   • Smokeless tobacco: Never Used   Vaping Use   • Vaping Use: Never used   Substance and Sexual Activity   • Alcohol use: No     Comment: Occasional    • Drug use: No   • Sexual activity: Defer           Objective   Physical Exam  Constitutional:       General: She is not in acute distress.     Appearance: Normal appearance. She is not ill-appearing, toxic-appearing or diaphoretic.   HENT:      Head: Normocephalic.      Nose: Nose normal.      Mouth/Throat:      Mouth: Mucous membranes are moist.      Pharynx: Oropharynx is clear.   Eyes:      General: No scleral icterus.     Conjunctiva/sclera: Conjunctivae normal.      Pupils: Pupils are equal, round, and reactive to light.   Cardiovascular:      Rate and Rhythm: Normal rate and regular rhythm.      Pulses: Normal pulses.   Pulmonary:      Effort: Pulmonary effort is normal.      Breath sounds: Rhonchi (Bibasilar) present.   Abdominal:      General: Bowel sounds are normal.      Tenderness: There is no abdominal tenderness. There is no guarding.   Musculoskeletal:         General: No tenderness. Normal range of motion.      Cervical back: Normal range of motion and neck supple.      Right lower leg: No edema.      Left lower leg: No edema.   Skin:     General: Skin is warm and dry.   Neurological:      General: No focal deficit present.      Mental Status: She is alert and oriented to person, place, and time.   Psychiatric:         Mood and Affect: Mood normal.         Procedures           ED Course      The patient has fairly significant bilateral rhonchi.  Her sats are maintaining in the mid to upper 90s on  room air.  Labs are benign with a white count of 7.81.  Normal H&H of 13/42.  proBNP is normal at 577.  Negative troponin.  No concerning chemistries.  Portable chest x-ray shows mild patchy opacifications at the bases.  No evidence of pneumothorax.  CT angio chest shows no evidence of PE.  There are small interstitial opacifications that may be pneumonia versus atelectasis.    The patient's daughter is at bedside now.  She expresses frustration and concern over the ongoing cough and shortness of breath.  She feels that the patient needs admission.  The patient is not on any oxygen at home.  I got the pt up at bedside to walk in place.  She became quite winded and her O2 dropped to 88%.  She was returned to bed and her sats improved back into the mid to upper 90s.  The patient had a negative COVID-19 test this morning but notes that she is in a facility that has had a recent outbreak.  I think a repeat testing via a respiratory panel is appropriate.  She has failed outpatient treatment with Zithromax and amoxicillin.  I think admission for IV antibiotics is reasonable.                                       MDM    Final diagnoses:   Pneumonia of both lower lobes due to infectious organism   Shortness of breath   Failure of outpatient treatment       ED Disposition  ED Disposition     ED Disposition Condition Comment    Decision to Admit            No follow-up provider specified.       Medication List      No changes were made to your prescriptions during this visit.          Sherwin Escobar PA  07/13/21 1536       Sherwin Escobar PA  07/13/21 1617

## 2021-07-13 NOTE — PLAN OF CARE
Goal Outcome Evaluation:  Plan of Care Reviewed With: patient        Progress: no change   New admit from ED. Pt is not c/o SOA at this time. Frequent coughing, non productive. No c/o pain. IV abx started. Will continue to monitor.     Problem: Fall Injury Risk  Goal: Absence of Fall and Fall-Related Injury  Outcome: Ongoing, Progressing     Problem: Infection (Pneumonia)  Goal: Resolution of Infection Signs and Symptoms  Outcome: Ongoing, Progressing     Problem: Respiratory Compromise (Pneumonia)  Goal: Effective Oxygenation and Ventilation  Outcome: Ongoing, Progressing

## 2021-07-13 NOTE — H&P
[facility] Medicine Services  Clinical Decision Unit (CDU)  History and Physical    Patient Name: Danay Connolly  : 1934  MRN: [id number]  Primary Care Physician: Bimal Garrett MD  Date of admission: 2021 12:40 PM      Subjective   Subjective     Chief Complaint:  Shortness of breath    HPI:  Danay Connolly is a 86 y.o. female With a past medical history of A. fib on chronic anticoagulation presented to the ED with a 5-week history of worsening shortness of breath, wheezing, cough with sputum production, denies any fever or chills, has been on azithromycin without improvement in symptoms, has recently been in contact with someone who was diagnosed with Covid at her assisted living facility.  She had a negative Covid test this morning.  Repeat testing here is pending.    COVID Details:    Symptoms:    [] NONE [] Fever [x]  Cough [x] Shortness of breath [] Change in taste/smell    The patient has a COVID HM Topic on their chart, and they are fully vaccinated.    Review of Systems   Constitutional: Positive for chills and fatigue. Negative for fever.   HENT: Negative for sore throat and trouble swallowing.    Eyes: Negative for photophobia and visual disturbance.   Cardiovascular: Negative for chest pain, palpitations and leg swelling.   Gastrointestinal: Negative for diarrhea, nausea and vomiting.   Endocrine: Negative for polyphagia and polyuria.   Genitourinary: Negative for difficulty urinating and dysuria.   Musculoskeletal: Negative for gait problem and joint swelling.   Skin: Negative for rash and wound.   Allergic/Immunologic: Negative for food allergies and immunocompromised state.   Neurological: Negative for dizziness and headaches.   Hematological: Negative for adenopathy. Bruises/bleeds easily.   Psychiatric/Behavioral: Negative for confusion and decreased concentration.      All other systems reviewed and negative    Personal History     Past Medical  History:   Diagnosis Date   • Arthritis    • Bladder prolapse, female, acquired    • Breast cyst     removed    • GERD (gastroesophageal reflux disease)    • H/O oral surgery    • H/O: hysterectomy    • Hx of colonoscopy with polypectomy    • Hypercholesterolemia    • Hyperlipidemia    • Hypertension    • Osteoporosis    • Seasonal allergies        Past Surgical History:   Procedure Laterality Date   • BREAST CYST ASPIRATION     • CARDIAC CATHETERIZATION  06/07/2018    40% Mid LAD, Mod- Sig MR. PA- 50/21 mmHg.   • CARDIOVASCULAR STRESS TEST  07/2011    Stress- 3 min, >100% THR. BP- 180/76. Negative   • CARDIOVASCULAR STRESS TEST  02/06/2017    L. Cardiolite- Negative   • CARDIOVASCULAR STRESS TEST  05/03/2018    L. Cardiolite- Breast attenuation.   • COLONOSCOPY      wilth polypectomy   • CONVERTED (HISTORICAL) HOLTER  06/27/2011     Holter- (Dr. Garrett)  AVG HR 73 BPM. Mul Artifacts. Few PAC's   • CONVERTED (HISTORICAL) HOLTER  04/30/2014    Holter- (Dr. Garrett) A-Fib at AVG  BPM.   • CONVERTED (HISTORICAL) HOLTER  01/29/2018    AVG  BPM. Freq PVC's 12.5%. Mul PAF   • ECHO - CONVERTED  08/27/1997    Echo- EF 50%. R/O Biscupid AV   • ECHO - CONVERTED  07/2011    Echo- EF 65%. Anterior WMA. RVSP- 53mmHg   • ECHO - CONVERTED  02/06/2017    EF 65%, no AS, mild MR, RSVP 40 mmHg   • ECHO - CONVERTED  05/03/2018    EF 60%. Mod-sev MR. LA.4.8. RVSP-45 mmHg   • ECHO - CONVERTED  11/26/2018    EF 60%. Mod MR. RVSP- 50 mmHg.   • ECHO - CONVERTED  03/08/2019    [facility]- TLS, LVEF 60%, moderate MR, moderate TR, RVSP 45 mmHg.    • ECHO - CONVERTED  08/28/2020    EF 60%. LA- 5.1 Cm. Mild MR. RVSP- 54 mmHg.   • HYSTERECTOMY     • MOUTH SURGERY     • PELVIC FLOOR REPAIR     • STEROID INJECTION KNEE Left    • COCO  05/14/2014    COCO and Cardioversion- No thrombus, converted to sinus with 200 J.       Family History:  family history includes Heart attack in her father; Hypertension in her son; Stroke in her mother. Otherwise  pertinent FHx was reviewed and unremarkable.     Social History:  reports that she has never smoked. She has never used smokeless tobacco. She reports that she does not drink alcohol and does not use drugs.  Social History     Social History Narrative    The patient lives alone in [place].       Medications:  FLUoxetine, Vitamin B12, Vitamin D3, albuterol sulfate HFA, apixaban, aspirin, dilTIAZem CD, fenofibrate, flecainide, furosemide, magnesium oxide, meclizine, multivitamin, potassium chloride, tiotropium, and valsartan    No Known Allergies    Objective   Objective     Vital Signs:   Temp:  [97.8 °F (36.6 °C)] 97.8 °F (36.6 °C)  Heart Rate:  [74-82] 82  Resp:  [16-18] 16  BP: (134-162)/(65-84) 134/65  Flow (L/min):  [2] 2    Physical Exam   Constitutional: Awake, alert  Eyes: PERRLA, sclerae anicteric, no conjunctival injection  HENT: NCAT, mucous membranes moist  Neck: Supple, no thyromegaly, no lymphadenopathy, trachea midline  Respiratory: has wheezing bilaterally, nonlabored respirations on room air   Cardiovascular: RRR, no murmurs, no lower extremity edema   Gastrointestinal: Positive bowel sounds, soft, nontender, nondistended  Psychiatric: Appropriate affect, cooperative  Neurologic: Oriented x 3, strength symmetric in all extremities, Cranial Nerves grossly intact to confrontation, speech clear  Skin: No rashes    Results Reviewed:  I have personally reviewed most recent radiology images and interpretations and agree with findings, most notably: pneumonia    LAB RESULTS:      Lab 07/13/21  1239   WBC 7.81   HEMOGLOBIN 13.3   HEMATOCRIT 42.4   PLATELETS 347   NEUTROS ABS 5.26   IMMATURE GRANS (ABS) 0.14*   LYMPHS ABS 1.51   MONOS ABS 0.70   EOS ABS 0.15   MCV 95.5   LACTATE 1.3         Lab 07/13/21  1239   SODIUM 138   POTASSIUM 4.1   CHLORIDE 102   CO2 24.0   ANION GAP 12.0   BUN 20   CREATININE 0.98   GLUCOSE 101*   CALCIUM 9.8         Lab 07/13/21  1239   TOTAL PROTEIN 7.1   ALBUMIN 4.20   GLOBULIN  2.9   ALT (SGPT) 10   AST (SGOT) 20   BILIRUBIN 0.3   ALK PHOS 70         Lab 07/13/21  1239   PROBNP 577.4   TROPONIN T <0.010                 Brief Urine Lab Results     None        Microbiology Results (last 10 days)     ** No results found for the last 240 hours. **           Results for orders placed during the hospital encounter of 08/28/20    Adult Transthoracic Echo Complete W/ Cont if Necessary Per Protocol    Interpretation Summary  · Left ventricular systolic function is normal.  · Left ventricular wall thickness is consistent with borderline concentric hypertrophy.  · Estimated EF appears to be in the range of 56 - 60%  · Left ventricular diastolic dysfunction (grade I) consistent with impaired relaxation.  · Left atrial cavity size is moderate-to-severely dilated. 5.1 Cm  · No aortic valve stenosis is present.  · Mild mitral valve regurgitation is present  · Moderate tricuspid valve regurgitation is present. rvsp- 54 mmHg        Assessment/Plan   Assessment / Plan     Active Hospital Problems    Diagnosis  POA   • Pneumonia of both lower lobes due to infectious organism [J18.9]  Yes   • COPD with acute exacerbation (CMS/HCC) [J44.1]  Yes   • Pulmonary hypertension (CMS/HCC) [I27.20]  Yes   • Current use of long term anticoagulation [Z79.01]  Not Applicable   • GERD (gastroesophageal reflux disease) [K21.9]  Yes   • PAF (paroxysmal atrial fibrillation) (CMS/HCC) [I48.0]  Yes   • Hypertension [I10]  Yes       Plan:  Mrs. Connolly is an 86y lady with a past medical history of A. fib on chronic anticoagulation presented to the ED with a 5-week history of worsening shortness of breath, wheezing, cough with sputum production, denies any fever or chills, has been on azithromycin without improvement in symptoms, has recently been in contact with someone who was diagnosed with Covid at her assisted living facility.  She had a negative Covid test this morning.  Repeat testing here is pending.    COPD  exacerbation  Pneumonia refractory to outpatient antibiotics   -CT chest without PE, has small subpleural interstitial disease in posterior lower lobes, and small foci of pneumonia  -Continue nebs scheduled, prednisone 40 mg x5 days, ceftriaxone and doxycycline     Atrial fibrillation on chronic anticoagulation   -continue eliquis, flecainide, cardizem     HTN   -continue valsartan      CODE STATUS:    Code Status and Medical Interventions:   Ordered at: 07/13/21 1740     Level Of Support Discussed With:    Patient     Code Status:    CPR     Medical Interventions (Level of Support Prior to Arrest):    Full     Admission Status: OBSERVATION status, however if further evaluation or treatment plans warrant, status may change.  Based upon current information, I predict patient's care encounter to be less than or equal to 2 midnights.         Discharge Blueprint (criteria for discharge):   1. Improvement in shortness of breath     Molly Noyola MD  07/13/21

## 2021-07-14 LAB
ANION GAP SERPL CALCULATED.3IONS-SCNC: 12 MMOL/L (ref 5–15)
BASOPHILS # BLD AUTO: 0.02 10*3/MM3 (ref 0–0.2)
BASOPHILS NFR BLD AUTO: 0.4 % (ref 0–1.5)
BUN SERPL-MCNC: 20 MG/DL (ref 8–23)
BUN/CREAT SERPL: 25 (ref 7–25)
CALCIUM SPEC-SCNC: 9.6 MG/DL (ref 8.6–10.5)
CHLORIDE SERPL-SCNC: 105 MMOL/L (ref 98–107)
CO2 SERPL-SCNC: 22 MMOL/L (ref 22–29)
CREAT SERPL-MCNC: 0.8 MG/DL (ref 0.57–1)
DEPRECATED RDW RBC AUTO: 45.6 FL (ref 37–54)
EOSINOPHIL # BLD AUTO: 0 10*3/MM3 (ref 0–0.4)
EOSINOPHIL NFR BLD AUTO: 0 % (ref 0.3–6.2)
ERYTHROCYTE [DISTWIDTH] IN BLOOD BY AUTOMATED COUNT: 13.1 % (ref 12.3–15.4)
GFR SERPL CREATININE-BSD FRML MDRD: 68 ML/MIN/1.73
GLUCOSE SERPL-MCNC: 156 MG/DL (ref 65–99)
HCT VFR BLD AUTO: 40.5 % (ref 34–46.6)
HGB BLD-MCNC: 12.7 G/DL (ref 12–15.9)
IMM GRANULOCYTES # BLD AUTO: 0.13 10*3/MM3 (ref 0–0.05)
IMM GRANULOCYTES NFR BLD AUTO: 2.6 % (ref 0–0.5)
LYMPHOCYTES # BLD AUTO: 0.78 10*3/MM3 (ref 0.7–3.1)
LYMPHOCYTES NFR BLD AUTO: 15.5 % (ref 19.6–45.3)
MAGNESIUM SERPL-MCNC: 2.1 MG/DL (ref 1.6–2.4)
MCH RBC QN AUTO: 29.7 PG (ref 26.6–33)
MCHC RBC AUTO-ENTMCNC: 31.4 G/DL (ref 31.5–35.7)
MCV RBC AUTO: 94.6 FL (ref 79–97)
MONOCYTES # BLD AUTO: 0.05 10*3/MM3 (ref 0.1–0.9)
MONOCYTES NFR BLD AUTO: 1 % (ref 5–12)
NEUTROPHILS NFR BLD AUTO: 4.05 10*3/MM3 (ref 1.7–7)
NEUTROPHILS NFR BLD AUTO: 80.5 % (ref 42.7–76)
NRBC BLD AUTO-RTO: 0 /100 WBC (ref 0–0.2)
PLAT MORPH BLD: NORMAL
PLATELET # BLD AUTO: 330 10*3/MM3 (ref 140–450)
PMV BLD AUTO: 9.2 FL (ref 6–12)
POTASSIUM SERPL-SCNC: 4.2 MMOL/L (ref 3.5–5.2)
RBC # BLD AUTO: 4.28 10*6/MM3 (ref 3.77–5.28)
RBC MORPH BLD: NORMAL
SODIUM SERPL-SCNC: 139 MMOL/L (ref 136–145)
WBC # BLD AUTO: 5.03 10*3/MM3 (ref 3.4–10.8)
WBC MORPH BLD: NORMAL

## 2021-07-14 PROCEDURE — 94799 UNLISTED PULMONARY SVC/PX: CPT

## 2021-07-14 PROCEDURE — 63710000001 PREDNISONE PER 1 MG: Performed by: INTERNAL MEDICINE

## 2021-07-14 PROCEDURE — 96366 THER/PROPH/DIAG IV INF ADDON: CPT

## 2021-07-14 PROCEDURE — 85025 COMPLETE CBC W/AUTO DIFF WBC: CPT | Performed by: INTERNAL MEDICINE

## 2021-07-14 PROCEDURE — 99225 PR SBSQ OBSERVATION CARE/DAY 25 MINUTES: CPT | Performed by: INTERNAL MEDICINE

## 2021-07-14 PROCEDURE — 25010000002 CEFTRIAXONE PER 250 MG: Performed by: INTERNAL MEDICINE

## 2021-07-14 PROCEDURE — 80048 BASIC METABOLIC PNL TOTAL CA: CPT | Performed by: INTERNAL MEDICINE

## 2021-07-14 PROCEDURE — G0378 HOSPITAL OBSERVATION PER HR: HCPCS

## 2021-07-14 PROCEDURE — 83735 ASSAY OF MAGNESIUM: CPT | Performed by: INTERNAL MEDICINE

## 2021-07-14 PROCEDURE — 85007 BL SMEAR W/DIFF WBC COUNT: CPT | Performed by: INTERNAL MEDICINE

## 2021-07-14 RX ORDER — AMOXICILLIN 500 MG/1
1000 CAPSULE ORAL 3 TIMES DAILY
COMMUNITY
Start: 2021-07-12 | End: 2021-07-15 | Stop reason: HOSPADM

## 2021-07-14 RX ORDER — CHOLECALCIFEROL (VITAMIN D3) 125 MCG
5 CAPSULE ORAL NIGHTLY PRN
Status: DISCONTINUED | OUTPATIENT
Start: 2021-07-14 | End: 2021-07-15 | Stop reason: HOSPADM

## 2021-07-14 RX ORDER — GABAPENTIN 100 MG/1
100 CAPSULE ORAL EVERY 12 HOURS SCHEDULED
Status: DISCONTINUED | OUTPATIENT
Start: 2021-07-14 | End: 2021-07-15 | Stop reason: HOSPADM

## 2021-07-14 RX ORDER — MAGNESIUM SULFATE 1 G/100ML
1 INJECTION INTRAVENOUS AS NEEDED
Status: DISCONTINUED | OUTPATIENT
Start: 2021-07-14 | End: 2021-07-15 | Stop reason: HOSPADM

## 2021-07-14 RX ORDER — MAGNESIUM SULFATE HEPTAHYDRATE 40 MG/ML
4 INJECTION, SOLUTION INTRAVENOUS AS NEEDED
Status: DISCONTINUED | OUTPATIENT
Start: 2021-07-14 | End: 2021-07-15 | Stop reason: HOSPADM

## 2021-07-14 RX ORDER — ACETAMINOPHEN AND CODEINE PHOSPHATE 300; 30 MG/1; MG/1
1 TABLET ORAL 2 TIMES DAILY PRN
COMMUNITY

## 2021-07-14 RX ORDER — MAGNESIUM SULFATE HEPTAHYDRATE 40 MG/ML
2 INJECTION, SOLUTION INTRAVENOUS AS NEEDED
Status: DISCONTINUED | OUTPATIENT
Start: 2021-07-14 | End: 2021-07-15 | Stop reason: HOSPADM

## 2021-07-14 RX ORDER — CHOLECALCIFEROL (VITAMIN D3) 125 MCG
5 CAPSULE ORAL NIGHTLY
COMMUNITY

## 2021-07-14 RX ADMIN — SODIUM CHLORIDE, PRESERVATIVE FREE 10 ML: 5 INJECTION INTRAVENOUS at 20:18

## 2021-07-14 RX ADMIN — DOXYCYCLINE 100 MG: 100 INJECTION, POWDER, LYOPHILIZED, FOR SOLUTION INTRAVENOUS at 06:04

## 2021-07-14 RX ADMIN — ACETAMINOPHEN 650 MG: 325 TABLET ORAL at 11:31

## 2021-07-14 RX ADMIN — VALSARTAN 40 MG: 80 TABLET, FILM COATED ORAL at 08:39

## 2021-07-14 RX ADMIN — PREDNISONE 40 MG: 20 TABLET ORAL at 08:39

## 2021-07-14 RX ADMIN — DOCUSATE SODIUM 50MG AND SENNOSIDES 8.6MG 2 TABLET: 8.6; 5 TABLET, FILM COATED ORAL at 20:18

## 2021-07-14 RX ADMIN — ASPIRIN 81 MG: 81 TABLET, COATED ORAL at 08:39

## 2021-07-14 RX ADMIN — ACETAMINOPHEN 650 MG: 325 TABLET ORAL at 02:04

## 2021-07-14 RX ADMIN — APIXABAN 5 MG: 5 TABLET, FILM COATED ORAL at 20:18

## 2021-07-14 RX ADMIN — IPRATROPIUM BROMIDE AND ALBUTEROL SULFATE 3 ML: 2.5; .5 SOLUTION RESPIRATORY (INHALATION) at 19:57

## 2021-07-14 RX ADMIN — DOCUSATE SODIUM 50MG AND SENNOSIDES 8.6MG 2 TABLET: 8.6; 5 TABLET, FILM COATED ORAL at 08:39

## 2021-07-14 RX ADMIN — CEFTRIAXONE SODIUM 1 G: 1 INJECTION, SOLUTION INTRAVENOUS at 17:31

## 2021-07-14 RX ADMIN — FLECAINIDE ACETATE 100 MG: 50 TABLET ORAL at 06:04

## 2021-07-14 RX ADMIN — FUROSEMIDE 20 MG: 20 TABLET ORAL at 06:04

## 2021-07-14 RX ADMIN — FENOFIBRATE 145 MG: 145 TABLET ORAL at 08:47

## 2021-07-14 RX ADMIN — DOXYCYCLINE 100 MG: 100 INJECTION, POWDER, LYOPHILIZED, FOR SOLUTION INTRAVENOUS at 18:34

## 2021-07-14 RX ADMIN — IPRATROPIUM BROMIDE AND ALBUTEROL SULFATE 3 ML: 2.5; .5 SOLUTION RESPIRATORY (INHALATION) at 12:22

## 2021-07-14 RX ADMIN — DILTIAZEM HYDROCHLORIDE 300 MG: 180 CAPSULE, COATED, EXTENDED RELEASE ORAL at 08:38

## 2021-07-14 RX ADMIN — POTASSIUM CHLORIDE 10 MEQ: 750 CAPSULE, EXTENDED RELEASE ORAL at 08:39

## 2021-07-14 RX ADMIN — APIXABAN 5 MG: 5 TABLET, FILM COATED ORAL at 08:40

## 2021-07-14 RX ADMIN — Medication 5 MG: at 20:18

## 2021-07-14 NOTE — CASE MANAGEMENT/SOCIAL WORK
Discharge Planning Assessment  [facility]     Patient Name: Danay Connolly  MRN: [id number]  Today's Date: 7/14/2021    Admit Date: 7/13/2021    Discharge Needs Assessment     Row Name 07/14/21 1028       Living Environment    Lives With  alone    Current Living Arrangements  independent/assisted living facility    Living Arrangement Comments  Lives in personal care at [facility] in [facility] [phone] (Fax [phone]). The staff gives her meds. She has housekeeping and meal services. Breakfast is sent to her room but she goes to dining area for other meals.       Discharge Needs Assessment    Equipment Currently Used at Home  wheelchair;rollator    Equipment Needed After Discharge  none    Discharge Coordination/Progress  Has had [facility] in the  recent past. The facility uses the pharmacy listed in [employer].        Discharge Plan     Row Name 07/14/21 1032       Plan    [place]  Home  at [place]    Patient/Family in Agreement with Plan  yes    Plan Comments  I spoke with the pt. Her goal is to return to her personal care facility at [place]. I spoke with staff there. Family will transport. The pt denies any DC needs at this time.    Final Discharge Disposition Code  01 - home or self-care    Row Name 07/14/21 0843       Plan    Final Discharge Disposition Code  01 - home or self-care        Continued Care and Services - Admitted Since 7/13/2021    Coordination has not been started for this encounter.       Expected Discharge Date and Time     Expected Discharge Date Expected Discharge Time    Jul 16, 2021         Demographic Summary    No documentation.       Functional Status     Row Name 07/14/21 1028       Functional Status, IADL    Medications  assistive person    Meal Preparation  other (see comments)    Housekeeping  other (see comments)    Laundry  other (see comments)    Shopping  assistive person        Psychosocial    No documentation.       Abuse/Neglect    No documentation.       Legal    No  documentation.       Substance Abuse    No documentation.       Patient Forms    No documentation.           Jordyn Degroot RN

## 2021-07-14 NOTE — PLAN OF CARE
Goal Outcome Evaluation:  Plan of Care Reviewed With: patient        Progress: improving   Pt is improving today. IV abx continued. Improved cough and lung sounds. Pt has been sitting in the chair most of the day. States she is feeling better. She would like to be D/C'ed tomorrow by 11 due to her ride time frame.       Problem: Fall Injury Risk  Goal: Absence of Fall and Fall-Related Injury  Outcome: Ongoing, Progressing  Intervention: Promote Injury-Free Environment  Recent Flowsheet Documentation  Taken 7/14/2021 1625 by Emma Mo RN  Safety Promotion/Fall Prevention:   activity supervised   assistive device/personal items within reach   clutter free environment maintained   fall prevention program maintained   lighting adjusted   muscle strengthening facilitated   nonskid shoes/slippers when out of bed   room organization consistent   safety round/check completed  Taken 7/14/2021 1221 by Emma Mo RN  Safety Promotion/Fall Prevention:   activity supervised   assistive device/personal items within reach  Taken 7/14/2021 1000 by Emma Mo RN  Safety Promotion/Fall Prevention:   activity supervised   assistive device/personal items within reach   clutter free environment maintained   fall prevention program maintained   lighting adjusted   muscle strengthening facilitated   nonskid shoes/slippers when out of bed   room organization consistent   safety round/check completed  Taken 7/14/2021 0843 by Emma Mo RN  Safety Promotion/Fall Prevention:   activity supervised   assistive device/personal items within reach   clutter free environment maintained   fall prevention program maintained   lighting adjusted   nonskid shoes/slippers when out of bed   muscle strengthening facilitated   room organization consistent   safety round/check completed     Problem: Infection (Pneumonia)  Goal: Resolution of Infection Signs and Symptoms  Outcome: Ongoing, Progressing     Problem: Respiratory  Compromise (Pneumonia)  Goal: Effective Oxygenation and Ventilation  Outcome: Ongoing, Progressing  Intervention: Optimize Oxygenation and Ventilation  Recent Flowsheet Documentation  Taken 7/14/2021 1625 by Emma Mo RN  Head of Bed (HOB): HOB at 45 degrees  Taken 7/14/2021 1221 by Emma Mo RN  Head of Bed (HOB): HOB at 60-90 degrees  Taken 7/14/2021 1000 by Emma oM RN  Head of Bed (HOB): HOB at 60-90 degrees  Taken 7/14/2021 0843 by Emma Mo RN  Head of Bed (HOB): HOB at 60-90 degrees

## 2021-07-14 NOTE — PROGRESS NOTES
[facility] Medicine Services  PROGRESS NOTE    Patient Name: Danay Connolly  : 1934  MRN: [id number]    Date of Admission: 2021  Primary Care Physician: Bimal Garrett MD    Subjective   Subjective     CC:  pneumonia    HPI:  Dyspnea improved. No fever. Wheezing better. Sputum scant and clear today    ROS:  No cp, no palpitations  No n/v/d; no abd pain  No dysuria  No rash  No muscle or joint pain    Objective   Objective     Vital Signs:   Temp:  [97.2 °F (36.2 °C)-98.2 °F (36.8 °C)] 98.1 °F (36.7 °C)  Heart Rate:  [62-82] 69  Resp:  [16-18] 18  BP: (132-152)/(61-82) 139/61  Flow (L/min):  [2] 2     Physical Exam:  Constitutional:Alert, oriented x 3, nontoxic appearing  Psych:Normal/appropriate affect  HEENT:Ncat, oroph clear  Neck: neck supple, full range of motion  Neuro: Face symmetric, speech clear, equal , moves all extremities  Cardiac: Rrr; No pretibial pitting edema  Resp: faint scattered rhonchi, normal effort at rest  GI: abd soft, nontender  Skin: No extremity rash  Musculoskeletal/extremities: no cyanosis extremities; no significant ankle edema          Results Reviewed:  LAB RESULTS:      Lab 21  0350 21  1239   WBC 5.03 7.81   HEMOGLOBIN 12.7 13.3   HEMATOCRIT 40.5 42.4   PLATELETS 330 347   NEUTROS ABS 4.05 5.26   IMMATURE GRANS (ABS) 0.13* 0.14*   LYMPHS ABS 0.78 1.51   MONOS ABS 0.05* 0.70   EOS ABS 0.00 0.15   MCV 94.6 95.5   PROCALCITONIN  --  0.06   LACTATE  --  1.3         Lab 21  0350 21  1239   SODIUM 139 138   POTASSIUM 4.2 4.1   CHLORIDE 105 102   CO2 22.0 24.0   ANION GAP 12.0 12.0   BUN 20 20   CREATININE 0.80 0.98   GLUCOSE 156* 101*   CALCIUM 9.6 9.8   MAGNESIUM 2.1  --          Lab 21  1239   TOTAL PROTEIN 7.1   ALBUMIN 4.20   GLOBULIN 2.9   ALT (SGPT) 10   AST (SGOT) 20   BILIRUBIN 0.3   ALK PHOS 70         Lab 21  1239   PROBNP 577.4   TROPONIN T <0.010                 Brief Urine Lab Results      None          Microbiology Results Abnormal     Procedure Component Value - Date/Time    COVID PRE-OP / PRE-PROCEDURE SCREENING ORDER (NO ISOLATION) - Swab, Nasopharynx [[id number]]  (Normal) Collected: 07/13/21 1549    Lab Status: Final result Specimen: Swab from Nasopharynx Updated: 07/13/21 1750    Narrative:      The following orders were created for panel order COVID PRE-OP / PRE-PROCEDURE SCREENING ORDER (NO ISOLATION) - Swab, Nasopharynx.  Procedure                               Abnormality         Status                     ---------                               -----------         ------                     Respiratory Panel PCR w/...[[id number]]  Normal              Final result                 Please view results for these tests on the individual orders.    Respiratory Panel PCR w/COVID-19(SARS-CoV-2) WHITNEY/MELONIE/AJ/PAD/COR/MAD/VIVIANA In-House, NP Swab in UTM/VTM, 3-4 HR TAT - Swab, Nasopharynx [[id number]]  (Normal) Collected: 07/13/21 1549    Lab Status: Final result Specimen: Swab from Nasopharynx Updated: 07/13/21 1750     ADENOVIRUS, PCR Not Detected     Coronavirus 229E Not Detected     Coronavirus HKU1 Not Detected     Coronavirus NL63 Not Detected     Coronavirus OC43 Not Detected     COVID19 Not Detected     Human Metapneumovirus Not Detected     Human Rhinovirus/Enterovirus Not Detected     Influenza A PCR Not Detected     Influenza B PCR Not Detected     Parainfluenza Virus 1 Not Detected     Parainfluenza Virus 2 Not Detected     Parainfluenza Virus 3 Not Detected     Parainfluenza Virus 4 Not Detected     RSV, PCR Not Detected     Bordetella pertussis pcr Not Detected     Bordetella parapertussis PCR Not Detected     Chlamydophila pneumoniae PCR Not Detected     Mycoplasma pneumo by PCR Not Detected    Narrative:      In the setting of a positive respiratory panel with a viral infection PLUS a negative procalcitonin without other underlying concern for bacterial infection, consider observing off  antibiotics or discontinuation of antibiotics and continue supportive care. If the respiratory panel is positive for atypical bacterial infection (Bordetella pertussis, Chlamydophila pneumoniae, or Mycoplasma pneumoniae), consider antibiotic de-escalation to target atypical bacterial infection.          XR Chest 1 View    Result Date: 7/13/2021  EXAMINATION: XR CHEST 1 VW-07/13/2021:  INDICATION: SOA, triage protocol.  COMPARISON: NONE.  FINDINGS: The heart shadow is borderline enlarged. The vasculature appears normal. The lungs appear clear except for trace subtle patchy interstitial changes in the left lung base. No edema, effusion, or pneumothorax is seen.      Impression: 1. Borderline cardiomegaly without congestive failure. 2. Mild patchy interstitial changes in left lung base, whether atelectasis or minimal changes of pneumonia.  D:  07/13/2021 E:  07/13/2021     This report was finalized on 7/13/2021 8:54 PM by Dr. Marquez Zabala MD.      CT Angiogram Chest    Result Date: 7/13/2021  EXAMINATION: CT ANGIOGRAM CHEST-07/13/2021:  INDICATION: Shortness of breath, chest pain, PE protocol.  TECHNIQUE: Spiral acquisition 3 mm post-IV contrast images through the chest with sagittal and coronal 2-D reconstructions.  The radiation dose reduction device was turned on for each scan per the ALARA (As Low as Reasonably Achievable) protocol.  COMPARISON: NONE.  FINDINGS: History indicates dyspnea and chest pain. There is excellent contrast opacification of the pulmonary arteries. No filling defects are seen to suggest pulmonary embolic disease. There is no evidence of thoracic aortic aneurysm or dissection, no evidence of pericardial or pleural effusion. There is a small hiatal hernia. No mediastinal adenopathy is appreciated.  Lung window images show the lungs to appear largely clear. There are very small foci of subpleural disease in the posterior lower lobes that may represent dependent atelectasis. Three of these areas  are slightly rounded, as might be seen with Covid-19 pneumonia, but otherwise not particularly suggestive of Covid-19. Small areas of pneumonia/aspiration might have this appearance.  Included images of the upper abdomen show fatty liver change, and small granulomatous calcifications in the liver and spleen. There is probably a 1 cm  splenic cyst. No significant abnormalities are seen of the included pancreatic tail, adrenal glands, or upper renal poles.      Impression: 1. No evidence of pulmonary embolic disease. 2. Several small foci of subpleural interstitial disease in the posterior lower lobes, possibly even just mild postinflammatory scarring. Small foci of pneumonia or atelectasis cannot be entirely excluded. 3. Incidentally noted small hiatal hernia. No other evidence of active chest disease is seen elsewhere.  D:  07/13/2021 E:  07/13/2021    This report was finalized on 7/13/2021 10:21 PM by Dr. Marquez Zabala MD.        Results for orders placed during the hospital encounter of 08/28/20    Adult Transthoracic Echo Complete W/ Cont if Necessary Per Protocol    Interpretation Summary  · Left ventricular systolic function is normal.  · Left ventricular wall thickness is consistent with borderline concentric hypertrophy.  · Estimated EF appears to be in the range of 56 - 60%  · Left ventricular diastolic dysfunction (grade I) consistent with impaired relaxation.  · Left atrial cavity size is moderate-to-severely dilated. 5.1 Cm  · No aortic valve stenosis is present.  · Mild mitral valve regurgitation is present  · Moderate tricuspid valve regurgitation is present. rvsp- 54 mmHg      I have reviewed the medications:  Scheduled Meds:apixaban, 5 mg, Oral, Q12H  aspirin, 81 mg, Oral, Daily  cefTRIAXone, 1 g, Intravenous, Q24H  dilTIAZem CD, 300 mg, Oral, Daily  doxycycline, 100 mg, Intravenous, Q12H  fenofibrate, 145 mg, Oral, Daily  flecainide, 100 mg, Oral, QAM  furosemide, 20 mg, Oral,  QAM  ipratropium-albuterol, 3 mL, Nebulization, 4x Daily - RT  potassium chloride, 10 mEq, Oral, Daily  predniSONE, 40 mg, Oral, Daily With Breakfast  senna-docusate sodium, 2 tablet, Oral, BID  sodium chloride, 10 mL, Intravenous, Q12H  valsartan, 40 mg, Oral, Daily      Continuous Infusions:   PRN Meds:.•  acetaminophen **OR** acetaminophen **OR** acetaminophen  •  senna-docusate sodium **AND** polyethylene glycol **AND** bisacodyl **AND** bisacodyl  •  magnesium sulfate **OR** magnesium sulfate in D5W 1g/100mL (PREMIX) **OR** magnesium sulfate  •  meclizine  •  melatonin  •  sodium chloride  •  sodium chloride    Assessment/Plan   Assessment & Plan     Active Hospital Problems    Diagnosis  POA   • Pneumonia of both lower lobes due to infectious organism [J18.9]  Yes   • COPD with acute exacerbation (CMS/[facility]) [J44.1]  Yes   • Pulmonary hypertension (CMS/[facility]) [I27.20]  Yes   • Current use of long term anticoagulation [Z79.01]  Not Applicable   • GERD (gastroesophageal reflux disease) [K21.9]  Yes   • PAF (paroxysmal atrial fibrillation) (CMS/[facility]) [I48.0]  Yes   • Hypertension [I10]  Yes      Resolved Hospital Problems   No resolved problems to display.        Brief Hospital Course to date:  Danay Connolly is a 86 y.o. female w/ copd, dhf, parox afib presented with dyspnea, wheezing. Had recently received z-pack as outpatient.     COPD w acute exacerbation  Pneumonia, bilateral lower lobe (failed outpatient rx)  -covid 19/respiratory pcr negative  -day #2 rocephin & doxy  -prednisone 40  -scheduled & prn nebs  Hx Parox afib   Chronic DHF   Mild-mod PHTN  HTN  -on tambocor, dilt, eliquis; daily lasix; diovan 40  -8/28/20 echo: ef 56-60%, diastolic dysfunction, mild mr; ervsp ~54mmhg    DVT prophylaxis:  Medical DVT prophylaxis orders are present. eliquis      Disposition: I expect the patient to be discharged home tomorrow if continues to clinically improve    CODE STATUS:   Code Status and Medical Interventions:    Ordered at: 07/13/21 4536     Level Of Support Discussed With:    Patient     Code Status:    CPR     Medical Interventions (Level of Support Prior to Arrest):    Full       David Cotter MD  07/14/21

## 2021-07-14 NOTE — PLAN OF CARE
Goal Outcome Evaluation:    VSS. Patient rested well overnight. Patient requested Melatonin to aid with sleep. Hospitalist notified and placed an order for melatonin but the patient refused. Patient with intermittent cough. Will continue to monitor.       Problem: Adult Inpatient Plan of Care  Goal: Plan of Care Review  Outcome: Ongoing, Progressing  Goal: Patient-Specific Goal (Individualized)  Outcome: Ongoing, Progressing  Goal: Absence of Hospital-Acquired Illness or Injury  Outcome: Ongoing, Progressing  Intervention: Identify and Manage Fall Risk  Recent Flowsheet Documentation  Taken 7/14/2021 0200 by Wyatt Neff RN  Safety Promotion/Fall Prevention:   activity supervised   assistive device/personal items within reach   clutter free environment maintained   safety round/check completed   room organization consistent  Taken 7/14/2021 0000 by Wyatt Neff RN  Safety Promotion/Fall Prevention:   activity supervised   assistive device/personal items within reach   clutter free environment maintained   safety round/check completed   room organization consistent  Taken 7/13/2021 2200 by Wyatt Neff RN  Safety Promotion/Fall Prevention:   activity supervised   assistive device/personal items within reach   clutter free environment maintained   safety round/check completed   room organization consistent  Taken 7/13/2021 2000 by Wyatt Neff RN  Safety Promotion/Fall Prevention:   activity supervised   assistive device/personal items within reach   clutter free environment maintained   safety round/check completed   room organization consistent  Intervention: Prevent Skin Injury  Recent Flowsheet Documentation  Taken 7/14/2021 0200 by Wyatt Neff RN  Body Position: position changed independently  Skin Protection: adhesive use limited  Taken 7/14/2021 0000 by Wyatt Neff RN  Body Position: position changed independently  Skin Protection: adhesive use limited  Taken 7/13/2021 2200 by Tawanda  GERMANIA Schneider  Body Position: position changed independently  Skin Protection: adhesive use limited  Taken 7/13/2021 2000 by Wyatt Neff RN  Body Position: position changed independently  Skin Protection: adhesive use limited  Intervention: Prevent and Manage VTE (venous thromboembolism) Risk  Recent Flowsheet Documentation  Taken 7/13/2021 2000 by Wyatt Neff RN  VTE Prevention/Management: bleeding risk factor(s) identified  Goal: Optimal Comfort and Wellbeing  Outcome: Ongoing, Progressing  Intervention: Provide Person-Centered Care  Recent Flowsheet Documentation  Taken 7/13/2021 2000 by Wyatt Neff RN  Trust Relationship/Rapport: care explained  Goal: Readiness for Transition of Care  Outcome: Ongoing, Progressing     Problem: Fall Injury Risk  Goal: Absence of Fall and Fall-Related Injury  Outcome: Ongoing, Progressing  Intervention: Promote Injury-Free Environment  Recent Flowsheet Documentation  Taken 7/14/2021 0200 by Wyatt Neff RN  Safety Promotion/Fall Prevention:   activity supervised   assistive device/personal items within reach   clutter free environment maintained   safety round/check completed   room organization consistent  Taken 7/14/2021 0000 by Wyatt Neff RN  Safety Promotion/Fall Prevention:   activity supervised   assistive device/personal items within reach   clutter free environment maintained   safety round/check completed   room organization consistent  Taken 7/13/2021 2200 by Wyatt Neff RN  Safety Promotion/Fall Prevention:   activity supervised   assistive device/personal items within reach   clutter free environment maintained   safety round/check completed   room organization consistent  Taken 7/13/2021 2000 by Wyatt Neff RN  Safety Promotion/Fall Prevention:   activity supervised   assistive device/personal items within reach   clutter free environment maintained   safety round/check completed   room organization consistent     Problem: Fluid Imbalance  (Pneumonia)  Goal: Fluid Balance  Outcome: Ongoing, Progressing     Problem: Infection (Pneumonia)  Goal: Resolution of Infection Signs and Symptoms  Outcome: Ongoing, Progressing     Problem: Respiratory Compromise (Pneumonia)  Goal: Effective Oxygenation and Ventilation  Outcome: Ongoing, Progressing  Intervention: Optimize Oxygenation and Ventilation  Recent Flowsheet Documentation  Taken 7/14/2021 0200 by Wyatt Neff RN  Head of Bed (HOB): HOB elevated  Taken 7/14/2021 0000 by Wyatt Neff RN  Head of Bed (HOB): HOB elevated  Taken 7/13/2021 2200 by Wyatt Neff RN  Head of Bed (HOB): HOB elevated  Taken 7/13/2021 2000 by Wyatt Neff RN  Head of Bed ([facility]): HOB elevated

## 2021-07-15 VITALS
HEIGHT: 65 IN | RESPIRATION RATE: 18 BRPM | SYSTOLIC BLOOD PRESSURE: 161 MMHG | TEMPERATURE: 98.1 F | BODY MASS INDEX: 27.16 KG/M2 | OXYGEN SATURATION: 99 % | HEART RATE: 69 BPM | DIASTOLIC BLOOD PRESSURE: 80 MMHG | WEIGHT: 163 LBS

## 2021-07-15 LAB
ANION GAP SERPL CALCULATED.3IONS-SCNC: 12 MMOL/L (ref 5–15)
BUN SERPL-MCNC: 29 MG/DL (ref 8–23)
BUN/CREAT SERPL: 35.8 (ref 7–25)
CALCIUM SPEC-SCNC: 9.7 MG/DL (ref 8.6–10.5)
CHLORIDE SERPL-SCNC: 107 MMOL/L (ref 98–107)
CO2 SERPL-SCNC: 21 MMOL/L (ref 22–29)
CREAT SERPL-MCNC: 0.81 MG/DL (ref 0.57–1)
GFR SERPL CREATININE-BSD FRML MDRD: 67 ML/MIN/1.73
GLUCOSE SERPL-MCNC: 126 MG/DL (ref 65–99)
MAGNESIUM SERPL-MCNC: 2.1 MG/DL (ref 1.6–2.4)
POTASSIUM SERPL-SCNC: 4.3 MMOL/L (ref 3.5–5.2)
SODIUM SERPL-SCNC: 140 MMOL/L (ref 136–145)

## 2021-07-15 PROCEDURE — 97161 PT EVAL LOW COMPLEX 20 MIN: CPT

## 2021-07-15 PROCEDURE — 63710000001 PREDNISONE PER 1 MG: Performed by: INTERNAL MEDICINE

## 2021-07-15 PROCEDURE — 99217 PR OBSERVATION CARE DISCHARGE MANAGEMENT: CPT | Performed by: INTERNAL MEDICINE

## 2021-07-15 PROCEDURE — 94799 UNLISTED PULMONARY SVC/PX: CPT

## 2021-07-15 PROCEDURE — 80048 BASIC METABOLIC PNL TOTAL CA: CPT | Performed by: INTERNAL MEDICINE

## 2021-07-15 PROCEDURE — 83735 ASSAY OF MAGNESIUM: CPT | Performed by: INTERNAL MEDICINE

## 2021-07-15 PROCEDURE — G0378 HOSPITAL OBSERVATION PER HR: HCPCS

## 2021-07-15 RX ORDER — DOXYCYCLINE 100 MG/1
100 CAPSULE ORAL EVERY 12 HOURS SCHEDULED
Qty: 6 CAPSULE | Refills: 0 | Status: SHIPPED | OUTPATIENT
Start: 2021-07-15 | End: 2021-07-15

## 2021-07-15 RX ORDER — DOXYCYCLINE 100 MG/1
100 CAPSULE ORAL EVERY 12 HOURS SCHEDULED
Status: DISCONTINUED | OUTPATIENT
Start: 2021-07-15 | End: 2021-07-15 | Stop reason: HOSPADM

## 2021-07-15 RX ORDER — CEFDINIR 300 MG/1
300 CAPSULE ORAL 2 TIMES DAILY
Qty: 6 CAPSULE | Refills: 0 | Status: SHIPPED | OUTPATIENT
Start: 2021-07-15 | End: 2021-07-15 | Stop reason: SDUPTHER

## 2021-07-15 RX ORDER — CEFDINIR 300 MG/1
300 CAPSULE ORAL 2 TIMES DAILY
Qty: 6 CAPSULE | Refills: 0 | Status: SHIPPED | OUTPATIENT
Start: 2021-07-15 | End: 2021-07-18

## 2021-07-15 RX ORDER — PREDNISONE 20 MG/1
TABLET ORAL
Qty: 6 TABLET | Refills: 0 | Status: SHIPPED | OUTPATIENT
Start: 2021-07-15 | End: 2021-07-15

## 2021-07-15 RX ORDER — PREDNISONE 20 MG/1
TABLET ORAL
Qty: 6 TABLET | Refills: 0 | Status: SHIPPED | OUTPATIENT
Start: 2021-07-15

## 2021-07-15 RX ORDER — DOXYCYCLINE 100 MG/1
100 CAPSULE ORAL EVERY 12 HOURS SCHEDULED
Qty: 6 CAPSULE | Refills: 0 | Status: SHIPPED | OUTPATIENT
Start: 2021-07-15 | End: 2021-07-18

## 2021-07-15 RX ADMIN — APIXABAN 5 MG: 5 TABLET, FILM COATED ORAL at 08:38

## 2021-07-15 RX ADMIN — DILTIAZEM HYDROCHLORIDE 300 MG: 180 CAPSULE, COATED, EXTENDED RELEASE ORAL at 08:38

## 2021-07-15 RX ADMIN — PREDNISONE 40 MG: 20 TABLET ORAL at 08:38

## 2021-07-15 RX ADMIN — ASPIRIN 81 MG: 81 TABLET, COATED ORAL at 08:38

## 2021-07-15 RX ADMIN — FENOFIBRATE 145 MG: 145 TABLET ORAL at 08:43

## 2021-07-15 RX ADMIN — IPRATROPIUM BROMIDE AND ALBUTEROL SULFATE 3 ML: 2.5; .5 SOLUTION RESPIRATORY (INHALATION) at 08:42

## 2021-07-15 RX ADMIN — FLECAINIDE ACETATE 100 MG: 50 TABLET ORAL at 06:12

## 2021-07-15 RX ADMIN — VALSARTAN 40 MG: 80 TABLET, FILM COATED ORAL at 08:38

## 2021-07-15 RX ADMIN — DOCUSATE SODIUM 50MG AND SENNOSIDES 8.6MG 2 TABLET: 8.6; 5 TABLET, FILM COATED ORAL at 08:38

## 2021-07-15 RX ADMIN — POTASSIUM CHLORIDE 10 MEQ: 750 CAPSULE, EXTENDED RELEASE ORAL at 08:38

## 2021-07-15 RX ADMIN — GABAPENTIN 100 MG: 100 CAPSULE ORAL at 08:38

## 2021-07-15 RX ADMIN — SODIUM CHLORIDE, PRESERVATIVE FREE 10 ML: 5 INJECTION INTRAVENOUS at 08:43

## 2021-07-15 RX ADMIN — DOXYCYCLINE 100 MG: 100 INJECTION, POWDER, LYOPHILIZED, FOR SOLUTION INTRAVENOUS at 06:08

## 2021-07-15 RX ADMIN — FUROSEMIDE 20 MG: 20 TABLET ORAL at 06:12

## 2021-07-15 NOTE — PLAN OF CARE
Goal Outcome Evaluation:  Plan of Care Reviewed With: patient        Progress: improving  Outcome Summary: PT eval completed.  Patient presents w/ generalized weakness and decreased functional endurance.  She completed STS transfers independently and ambulated 165ft w/ rollator and supervision.  Gait distance limited by fatigue and increased low back pain.  Anticipate pt. to return to [facility] later today.  Recommend HHPT.

## 2021-07-15 NOTE — DISCHARGE SUMMARY
[facility]  DISCHARGE SUMMARY    Patient Name: Danay Connolly  : 1934  MRN: [id number]    Date of Admission: 2021 12:40 PM  Date of Discharge:  7/15/2021  Primary Care Physician: Bimal Garrett MD    Consults     No orders found from 2021 to 2021.          Hospital Course     Presenting Problem:   Pneumonia of both lower lobes due to infectious organism [J18.9]    Active Hospital Problems    Diagnosis  POA   • Pneumonia of both lower lobes due to infectious organism [J18.9]  Yes   • COPD with acute exacerbation (CMS/HCC) [J44.1]  Yes   • Pulmonary hypertension (CMS/HCC) [I27.20]  Yes   • Current use of long term anticoagulation [Z79.01]  Not Applicable   • GERD (gastroesophageal reflux disease) [K21.9]  Yes   • PAF (paroxysmal atrial fibrillation) (CMS/[facility]) [I48.0]  Yes   • Hypertension [I10]  Yes      Resolved Hospital Problems   No resolved problems to display.        Hospital Course:  Danay Connolly is a 86 y.o. female w/ copd, dhf, parox afib presented with dyspnea, wheezing. Had recently received outpatient antibiotics. Was admitted to hospitalist service and initiated on bronchodilators, steroids, and antibiotics. Patient rapidly improved symptomatically and now feels ready for discharge home. Has required no supplemental oxygen. Will d/c home w/ prednisone 40mg x 3 more days, 3 more days of omnicef & doxycycline w/ follow up pcp in a week. Patient appreciative of care received and voices understanding of the plan.      Discharge Follow Up Recommendations for outpatient labs/diagnostics:   pcp 1 week    Day of Discharge     HPI:   Feels well, minimal cough, no dyspnea. Wants to go home    Review of Systems  No f/c, n chest pain or palpitations    Vital Signs:   Temp:  [98 °F (36.7 °C)-98.2 °F (36.8 °C)] 98.1 °F (36.7 °C)  Heart Rate:  [57-73] 69  Resp:  [16-18] 18  BP: (127-169)/(58-85) 161/80     Physical Exam:  Constitutional:Alert,  oriented x 3, nontoxic appearing  Psych:Normal/appropriate affect  HEENT:Ncat, oroph clear  Neck: neck supple, full range of motion  Neuro: Face symmetric, speech clear, equal , moves all extremities  Cardiac: Rrr; No pretibial pitting edema  Resp: Ctab, normal effort  GI: abd soft, nontender  Skin: No extremity rash  Musculoskeletal/extremities: no cyanosis extremities; no significant ankle edema            Pertinent  and/or Most Recent Results     LAB RESULTS:      Lab 07/14/21  0350 07/13/21  1239   WBC 5.03 7.81   HEMOGLOBIN 12.7 13.3   HEMATOCRIT 40.5 42.4   PLATELETS 330 347   NEUTROS ABS 4.05 5.26   IMMATURE GRANS (ABS) 0.13* 0.14*   LYMPHS ABS 0.78 1.51   MONOS ABS 0.05* 0.70   EOS ABS 0.00 0.15   MCV 94.6 95.5   PROCALCITONIN  --  0.06   LACTATE  --  1.3         Lab 07/15/21  0352 07/14/21  0350 07/13/21  1239   SODIUM 140 139 138   POTASSIUM 4.3 4.2 4.1   CHLORIDE 107 105 102   CO2 21.0* 22.0 24.0   ANION GAP 12.0 12.0 12.0   BUN 29* 20 20   CREATININE 0.81 0.80 0.98   GLUCOSE 126* 156* 101*   CALCIUM 9.7 9.6 9.8   MAGNESIUM 2.1 2.1  --          Lab 07/13/21  1239   TOTAL PROTEIN 7.1   ALBUMIN 4.20   GLOBULIN 2.9   ALT (SGPT) 10   AST (SGOT) 20   BILIRUBIN 0.3   ALK PHOS 70         Lab 07/13/21  1239   PROBNP 577.4   TROPONIN T <0.010                 Brief Urine Lab Results     None        Microbiology Results (last 10 days)     Procedure Component Value - Date/Time    Blood Culture - Blood, Arm, Left [[id number]] Collected: 07/13/21 1724    Lab Status: Preliminary result Specimen: Blood from Arm, Left Updated: 07/14/21 1815     Blood Culture No growth at 24 hours    Blood Culture - Blood, Arm, Right [[id number]] Collected: 07/13/21 1645    Lab Status: Preliminary result Specimen: Blood from Arm, Right Updated: 07/14/21 1815     Blood Culture No growth at 24 hours    COVID PRE-OP / PRE-PROCEDURE SCREENING ORDER (NO ISOLATION) - Swab, Nasopharynx [[id number]]  (Normal) Collected: 07/13/21 4988    Lab  Status: Final result Specimen: Swab from Nasopharynx Updated: 07/13/21 1750    Narrative:      The following orders were created for panel order COVID PRE-OP / PRE-PROCEDURE SCREENING ORDER (NO ISOLATION) - Swab, Nasopharynx.  Procedure                               Abnormality         Status                     ---------                               -----------         ------                     Respiratory Panel PCR w/...[[id number]]  Normal              Final result                 Please view results for these tests on the individual orders.    Respiratory Panel PCR w/COVID-19(SARS-CoV-2) WHITNEY/MELONIE/AJ/PAD/COR/MAD/VIVIANA In-House, NP Swab in UTM/VTM, 3-4 HR TAT - Swab, Nasopharynx [[id number]]  (Normal) Collected: 07/13/21 1546    Lab Status: Final result Specimen: Swab from Nasopharynx Updated: 07/13/21 1750     ADENOVIRUS, PCR Not Detected     Coronavirus 229E Not Detected     Coronavirus HKU1 Not Detected     Coronavirus NL63 Not Detected     Coronavirus OC43 Not Detected     COVID19 Not Detected     Human Metapneumovirus Not Detected     Human Rhinovirus/Enterovirus Not Detected     Influenza A PCR Not Detected     Influenza B PCR Not Detected     Parainfluenza Virus 1 Not Detected     Parainfluenza Virus 2 Not Detected     Parainfluenza Virus 3 Not Detected     Parainfluenza Virus 4 Not Detected     RSV, PCR Not Detected     Bordetella pertussis pcr Not Detected     Bordetella parapertussis PCR Not Detected     Chlamydophila pneumoniae PCR Not Detected     Mycoplasma pneumo by PCR Not Detected    Narrative:      In the setting of a positive respiratory panel with a viral infection PLUS a negative procalcitonin without other underlying concern for bacterial infection, consider observing off antibiotics or discontinuation of antibiotics and continue supportive care. If the respiratory panel is positive for atypical bacterial infection (Bordetella pertussis, Chlamydophila pneumoniae, or Mycoplasma pneumoniae),  consider antibiotic de-escalation to target atypical bacterial infection.          XR Chest 1 View    Result Date: 7/13/2021  EXAMINATION: XR CHEST 1 VW-07/13/2021:  INDICATION: SOA, triage protocol.  COMPARISON: NONE.  FINDINGS: The heart shadow is borderline enlarged. The vasculature appears normal. The lungs appear clear except for trace subtle patchy interstitial changes in the left lung base. No edema, effusion, or pneumothorax is seen.      1. Borderline cardiomegaly without congestive failure. 2. Mild patchy interstitial changes in left lung base, whether atelectasis or minimal changes of pneumonia.  D:  07/13/2021 E:  07/13/2021     This report was finalized on 7/13/2021 8:54 PM by Dr. Maqruez Zabala MD.      CT Angiogram Chest    Result Date: 7/13/2021  EXAMINATION: CT ANGIOGRAM CHEST-07/13/2021:  INDICATION: Shortness of breath, chest pain, PE protocol.  TECHNIQUE: Spiral acquisition 3 mm post-IV contrast images through the chest with sagittal and coronal 2-D reconstructions.  The radiation dose reduction device was turned on for each scan per the ALARA (As Low as Reasonably Achievable) protocol.  COMPARISON: NONE.  FINDINGS: History indicates dyspnea and chest pain. There is excellent contrast opacification of the pulmonary arteries. No filling defects are seen to suggest pulmonary embolic disease. There is no evidence of thoracic aortic aneurysm or dissection, no evidence of pericardial or pleural effusion. There is a small hiatal hernia. No mediastinal adenopathy is appreciated.  Lung window images show the lungs to appear largely clear. There are very small foci of subpleural disease in the posterior lower lobes that may represent dependent atelectasis. Three of these areas are slightly rounded, as might be seen with Covid-19 pneumonia, but otherwise not particularly suggestive of Covid-19. Small areas of pneumonia/aspiration might have this appearance.  Included images of the upper abdomen show fatty  liver change, and small granulomatous calcifications in the liver and spleen. There is probably a 1 cm  splenic cyst. No significant abnormalities are seen of the included pancreatic tail, adrenal glands, or upper renal poles.      1. No evidence of pulmonary embolic disease. 2. Several small foci of subpleural interstitial disease in the posterior lower lobes, possibly even just mild postinflammatory scarring. Small foci of pneumonia or atelectasis cannot be entirely excluded. 3. Incidentally noted small hiatal hernia. No other evidence of active chest disease is seen elsewhere.  D:  07/13/2021 E:  07/13/2021    This report was finalized on 7/13/2021 10:21 PM by Dr. Marquez Zabala MD.                Results for orders placed during the hospital encounter of 08/28/20    Adult Transthoracic Echo Complete W/ Cont if Necessary Per Protocol    Interpretation Summary  · Left ventricular systolic function is normal.  · Left ventricular wall thickness is consistent with borderline concentric hypertrophy.  · Estimated EF appears to be in the range of 56 - 60%  · Left ventricular diastolic dysfunction (grade I) consistent with impaired relaxation.  · Left atrial cavity size is moderate-to-severely dilated. 5.1 Cm  · No aortic valve stenosis is present.  · Mild mitral valve regurgitation is present  · Moderate tricuspid valve regurgitation is present. rvsp- 54 mmHg      Plan for Follow-up of Pending Labs/Results: pcp  Pending Labs     Order Current Status    Blood Culture - Blood, Arm, Left Preliminary result    Blood Culture - Blood, Arm, Right Preliminary result        Discharge Details        Discharge Medications      New Medications      Instructions Start Date   cefdinir 300 MG capsule  Commonly known as: OMNICEF   300 mg, Oral, 2 Times Daily      doxycycline 100 MG capsule  Commonly known as: MONODOX   100 mg, Oral, Every 12 Hours Scheduled      PHARMACY MEDS TO BED CONSULT   Does not apply, Daily      predniSONE 20 MG  tablet  Commonly known as: DELTASONE   40mg daily x 3 days (starting 7/16/21)         Continue These Medications      Instructions Start Date   acetaminophen-codeine 300-30 MG per tablet  Commonly known as: TYLENOL #3   1 tablet, Oral, 2 Times Daily PRN      albuterol (5 MG/ML) 0.5% nebulizer solution  Commonly known as: PROVENTIL   2.5 mg, Nebulization, Every 4 Hours PRN      albuterol sulfate  (90 Base) MCG/ACT inhaler  Commonly known as: PROVENTIL HFA;VENTOLIN HFA;PROAIR HFA   1 puff, Inhalation, Nightly      apixaban 5 MG tablet tablet  Commonly known as: ELIQUIS   5 mg, Oral, Every 12 Hours Scheduled      aspirin 81 MG EC tablet   81 mg, Oral, Daily      Diclofenac Sodium 1 % gel gel  Commonly known as: VOLTAREN   4 g, Topical, 2 times daily, Apply to lumbar area twice daily       dilTIAZem  MG 24 hr capsule  Commonly known as: CARDIZEM CD   300 mg, Oral, Daily      fenofibrate 145 MG tablet  Commonly known as: TRICOR   145 mg, Oral, Nightly      flecainide 100 MG tablet  Commonly known as: TAMBOCOR   TAKE 1 TABLET IN THE MORNING AND ONE-HALF (1/2) TABLET AT NIGHT AS DIRECTED      furosemide 20 MG tablet  Commonly known as: LASIX   20 mg, Oral, Daily      gabapentin 100 MG capsule  Commonly known as: NEURONTIN   100 mg, Oral, 2 Times Daily      guaiFENesin 100 MG/5ML solution oral solution  Commonly known as: ROBITUSSIN   200 mg, Oral, Every 4 Hours PRN      magnesium oxide 400 (241.3 Mg) MG tablet tablet  Commonly known as: MAGOX   400 mg, Oral, Daily      melatonin 5 MG tablet tablet   5 mg, Oral, Nightly      methocarbamol 500 MG tablet  Commonly known as: ROBAXIN   500 mg, Oral, Every 8 Hours PRN      multivitamin tablet tablet  Commonly known as: THERAGRAN   1 tablet, Oral, Daily      polyethylene glycol 17 g packet  Commonly known as: MIRALAX   17 g, Oral, Daily      potassium chloride 10 MEQ CR capsule  Commonly known as: MICRO-K   10 mEq, Oral, Daily      tiotropium 18 MCG per inhalation  capsule  Commonly known as: SPIRIVA   1 capsule, Inhalation, Daily - RT      valsartan 40 MG tablet  Commonly known as: DIOVAN   40 mg, Oral, Daily      Vitamin D3 50 MCG (2000 UT) tablet   1 tablet, Oral, Daily         Stop These Medications    amoxicillin 500 MG capsule  Commonly known as: AMOXIL            No Known Allergies      Discharge Disposition:  Home or Self Care    Diet:  Hospital:  Diet Order   Procedures   • Diet Regular       Activity:           CODE STATUS:    Code Status and Medical Interventions:   Ordered at: 07/13/21 6990     Level Of Support Discussed With:    Patient     Code Status:    CPR     Medical Interventions (Level of Support Prior to Arrest):    Full       No future appointments.    Additional Instructions for the Follow-ups that You Need to Schedule     Discharge Follow-up with PCP   As directed       Currently Documented PCP:    Bimal Garrett MD    PCP Phone Number:    [phone]     Follow Up Details: 1 week                     David Cotter MD  07/15/21      Time Spent on Discharge:  I spent  35  minutes on this discharge activity which included: face-to-face encounter with the patient, reviewing the data in the system, coordination of the care with the nursing staff as well as consultants, documentation, and entering orders.

## 2021-07-15 NOTE — DISCHARGE PLACEMENT REQUEST
"Danay Zambrano (86 y.o. Female)   [facility]  From Jordyn Degroot [phone]    Date of Birth Social Security Number Address Home Phone MRN    1934  [address] [place] [address]   [place] [address] [phone] [id number]    [employer] Mar[employer] Status          Protestant        Admission Date Admission Type Admitting Provider Attending Provider Department, Room/Bed    21 Emergency David Cotter MD West, Christopher R, MD [facility] 5F, S518/1    Discharge Date Discharge Disposition Discharge Destination         Home or Self Care              Attending Provider: David Cotter MD    Allergies: No Known Allergies    Isolation: None   Infection: None   Code Status: CPR    Ht: 165.1 cm (65\")   Wt: 73.9 kg (163 lb)    Admission Cmt: None   Principal Problem: None                Active Insurance as of 2021     Primary Coverage     Payor Plan Insurance Group Employer/Plan Group    [facility] MEDICARE REPLACEMENT [facility] MEDICARE REPLACEMENT [id number]     Payor Plan Address Payor Plan Phone Number Payor Plan Fax Number Effective Dates    [address] [address]   2016 - None Entered    [facility] [id number]       Subscriber Name Subscriber Birth Date Member ID       DANAY ZAMBRANO 1934 [id number]                 Emergency Contacts      (Rel.) Home Phone Work Phone Mobile Phone    Brain Zambrano (Son) [phone] -- --    CLARY ZAMBRANO (Daughter) [phone] -- --                 Discharge Summary      David Cotter MD at 07/15/21 0857              [facility]  DISCHARGE SUMMARY    Patient Name: Danay Zambrano  : 1934  MRN: [id number]    Date of Admission: 2021 12:40 PM  Date of Discharge:  7/15/2021  Primary Care Physician: Bimal Garrett MD    Consults     No orders found from 2021 to 2021.          Hospital Course     Presenting Problem:   Pneumonia of both " lower lobes due to infectious organism [J18.9]    Active Hospital Problems    Diagnosis  POA   • Pneumonia of both lower lobes due to infectious organism [J18.9]  Yes   • COPD with acute exacerbation (CMS/Formerly [facility]) [J44.1]  Yes   • Pulmonary hypertension (CMS/Formerly [facility]) [I27.20]  Yes   • Current use of long term anticoagulation [Z79.01]  Not Applicable   • GERD (gastroesophageal reflux disease) [K21.9]  Yes   • PAF (paroxysmal atrial fibrillation) (CMS/Formerly [facility]) [I48.0]  Yes   • Hypertension [I10]  Yes      Resolved Hospital Problems   No resolved problems to display.        Hospital Course:  Danay Connolly is a 86 y.o. female w/ copd, dhf, parox afib presented with dyspnea, wheezing. Had recently received outpatient antibiotics. Was admitted to hospitalist service and initiated on bronchodilators, steroids, and antibiotics. Patient rapidly improved symptomatically and now feels ready for discharge home. Has required no supplemental oxygen. Will d/c home w/ prednisone 40mg x 3 more days, 3 more days of omnicef & doxycycline w/ follow up pcp in a week. Patient appreciative of care received and voices understanding of the plan.      Discharge Follow Up Recommendations for outpatient labs/diagnostics:   pcp 1 week    Day of Discharge     HPI:   Feels well, minimal cough, no dyspnea. Wants to go home    Review of Systems  No f/c, n chest pain or palpitations    Vital Signs:   Temp:  [98 °F (36.7 °C)-98.2 °F (36.8 °C)] 98.1 °F (36.7 °C)  Heart Rate:  [57-73] 69  Resp:  [16-18] 18  BP: (127-169)/(58-85) 161/80     Physical Exam:  Constitutional:Alert, oriented x 3, nontoxic appearing  Psych:Normal/appropriate affect  HEENT:Ncat, oroph clear  Neck: neck supple, full range of motion  Neuro: Face symmetric, speech clear, equal , moves all extremities  Cardiac: Rrr; No pretibial pitting edema  Resp: Ctab, normal effort  GI: abd soft, nontender  Skin: No extremity rash  Musculoskeletal/extremities: no cyanosis extremities; no significant  ankle edema            Pertinent  and/or Most Recent Results     LAB RESULTS:      Lab 07/14/21  0350 07/13/21  1239   WBC 5.03 7.81   HEMOGLOBIN 12.7 13.3   HEMATOCRIT 40.5 42.4   PLATELETS 330 347   NEUTROS ABS 4.05 5.26   IMMATURE GRANS (ABS) 0.13* 0.14*   LYMPHS ABS 0.78 1.51   MONOS ABS 0.05* 0.70   EOS ABS 0.00 0.15   MCV 94.6 95.5   PROCALCITONIN  --  0.06   LACTATE  --  1.3         Lab 07/15/21  0352 07/14/21  0350 07/13/21  1239   SODIUM 140 139 138   POTASSIUM 4.3 4.2 4.1   CHLORIDE 107 105 102   CO2 21.0* 22.0 24.0   ANION GAP 12.0 12.0 12.0   BUN 29* 20 20   CREATININE 0.81 0.80 0.98   GLUCOSE 126* 156* 101*   CALCIUM 9.7 9.6 9.8   MAGNESIUM 2.1 2.1  --          Lab 07/13/21  1239   TOTAL PROTEIN 7.1   ALBUMIN 4.20   GLOBULIN 2.9   ALT (SGPT) 10   AST (SGOT) 20   BILIRUBIN 0.3   ALK PHOS 70         Lab 07/13/21  1239   PROBNP 577.4   TROPONIN T <0.010                 Brief Urine Lab Results     None        Microbiology Results (last 10 days)     Procedure Component Value - Date/Time    Blood Culture - Blood, Arm, Left [[id number]] Collected: 07/13/21 1724    Lab Status: Preliminary result Specimen: Blood from Arm, Left Updated: 07/14/21 1815     Blood Culture No growth at 24 hours    Blood Culture - Blood, Arm, Right [[id number]] Collected: 07/13/21 1645    Lab Status: Preliminary result Specimen: Blood from Arm, Right Updated: 07/14/21 1815     Blood Culture No growth at 24 hours    COVID PRE-OP / PRE-PROCEDURE SCREENING ORDER (NO ISOLATION) - Swab, Nasopharynx [[id number]]  (Normal) Collected: 07/13/21 1549    Lab Status: Final result Specimen: Swab from Nasopharynx Updated: 07/13/21 1750    Narrative:      The following orders were created for panel order COVID PRE-OP / PRE-PROCEDURE SCREENING ORDER (NO ISOLATION) - Swab, Nasopharynx.  Procedure                               Abnormality         Status                     ---------                               -----------         ------                      Respiratory Panel PCR w/...[[id number]]  Normal              Final result                 Please view results for these tests on the individual orders.    Respiratory Panel PCR w/COVID-19(SARS-CoV-2) WHITNEY/MELONIE/AJ/PAD/COR/MAD/VIVIANA In-House, NP Swab in UTM/VTM, 3-4 HR TAT - Swab, Nasopharynx [[id number]]  (Normal) Collected: 07/13/21 1549    Lab Status: Final result Specimen: Swab from Nasopharynx Updated: 07/13/21 7621     ADENOVIRUS, PCR Not Detected     Coronavirus 229E Not Detected     Coronavirus HKU1 Not Detected     Coronavirus NL63 Not Detected     Coronavirus OC43 Not Detected     COVID19 Not Detected     Human Metapneumovirus Not Detected     Human Rhinovirus/Enterovirus Not Detected     Influenza A PCR Not Detected     Influenza B PCR Not Detected     Parainfluenza Virus 1 Not Detected     Parainfluenza Virus 2 Not Detected     Parainfluenza Virus 3 Not Detected     Parainfluenza Virus 4 Not Detected     RSV, PCR Not Detected     Bordetella pertussis pcr Not Detected     Bordetella parapertussis PCR Not Detected     Chlamydophila pneumoniae PCR Not Detected     Mycoplasma pneumo by PCR Not Detected    Narrative:      In the setting of a positive respiratory panel with a viral infection PLUS a negative procalcitonin without other underlying concern for bacterial infection, consider observing off antibiotics or discontinuation of antibiotics and continue supportive care. If the respiratory panel is positive for atypical bacterial infection (Bordetella pertussis, Chlamydophila pneumoniae, or Mycoplasma pneumoniae), consider antibiotic de-escalation to target atypical bacterial infection.          XR Chest 1 View    Result Date: 7/13/2021  EXAMINATION: XR CHEST 1 VW-07/13/2021:  INDICATION: SOA, triage protocol.  COMPARISON: NONE.  FINDINGS: The heart shadow is borderline enlarged. The vasculature appears normal. The lungs appear clear except for trace subtle patchy interstitial changes in the left lung base. No  edema, effusion, or pneumothorax is seen.      1. Borderline cardiomegaly without congestive failure. 2. Mild patchy interstitial changes in left lung base, whether atelectasis or minimal changes of pneumonia.  D:  07/13/2021 E:  07/13/2021     This report was finalized on 7/13/2021 8:54 PM by Dr. Marquez Zabala MD.      CT Angiogram Chest    Result Date: 7/13/2021  EXAMINATION: CT ANGIOGRAM CHEST-07/13/2021:  INDICATION: Shortness of breath, chest pain, PE protocol.  TECHNIQUE: Spiral acquisition 3 mm post-IV contrast images through the chest with sagittal and coronal 2-D reconstructions.  The radiation dose reduction device was turned on for each scan per the ALARA (As Low as Reasonably Achievable) protocol.  COMPARISON: NONE.  FINDINGS: History indicates dyspnea and chest pain. There is excellent contrast opacification of the pulmonary arteries. No filling defects are seen to suggest pulmonary embolic disease. There is no evidence of thoracic aortic aneurysm or dissection, no evidence of pericardial or pleural effusion. There is a small hiatal hernia. No mediastinal adenopathy is appreciated.  Lung window images show the lungs to appear largely clear. There are very small foci of subpleural disease in the posterior lower lobes that may represent dependent atelectasis. Three of these areas are slightly rounded, as might be seen with Covid-19 pneumonia, but otherwise not particularly suggestive of Covid-19. Small areas of pneumonia/aspiration might have this appearance.  Included images of the upper abdomen show fatty liver change, and small granulomatous calcifications in the liver and spleen. There is probably a 1 cm  splenic cyst. No significant abnormalities are seen of the included pancreatic tail, adrenal glands, or upper renal poles.      1. No evidence of pulmonary embolic disease. 2. Several small foci of subpleural interstitial disease in the posterior lower lobes, possibly even just mild postinflammatory  scarring. Small foci of pneumonia or atelectasis cannot be entirely excluded. 3. Incidentally noted small hiatal hernia. No other evidence of active chest disease is seen elsewhere.  D:  07/13/2021 E:  07/13/2021    This report was finalized on 7/13/2021 10:21 PM by Dr. Marquez Zabala MD.                Results for orders placed during the hospital encounter of 08/28/20    Adult Transthoracic Echo Complete W/ Cont if Necessary Per Protocol    Interpretation Summary  · Left ventricular systolic function is normal.  · Left ventricular wall thickness is consistent with borderline concentric hypertrophy.  · Estimated EF appears to be in the range of 56 - 60%  · Left ventricular diastolic dysfunction (grade I) consistent with impaired relaxation.  · Left atrial cavity size is moderate-to-severely dilated. 5.1 Cm  · No aortic valve stenosis is present.  · Mild mitral valve regurgitation is present  · Moderate tricuspid valve regurgitation is present. rvsp- 54 mmHg      Plan for Follow-up of Pending Labs/Results: pcp  Pending Labs     Order Current Status    Blood Culture - Blood, Arm, Left Preliminary result    Blood Culture - Blood, Arm, Right Preliminary result        Discharge Details        Discharge Medications      New Medications      Instructions Start Date   cefdinir 300 MG capsule  Commonly known as: OMNICEF   300 mg, Oral, 2 Times Daily      doxycycline 100 MG capsule  Commonly known as: MONODOX   100 mg, Oral, Every 12 Hours Scheduled      PHARMACY MEDS TO BED CONSULT   Does not apply, Daily      predniSONE 20 MG tablet  Commonly known as: DELTASONE   40mg daily x 3 days (starting 7/16/21)         Continue These Medications      Instructions Start Date   acetaminophen-codeine 300-30 MG per tablet  Commonly known as: TYLENOL #3   1 tablet, Oral, 2 Times Daily PRN      albuterol (5 MG/ML) 0.5% nebulizer solution  Commonly known as: PROVENTIL   2.5 mg, Nebulization, Every 4 Hours PRN      albuterol sulfate   (90 Base) MCG/ACT inhaler  Commonly known as: PROVENTIL HFA;VENTOLIN HFA;PROAIR HFA   1 puff, Inhalation, Nightly      apixaban 5 MG tablet tablet  Commonly known as: ELIQUIS   5 mg, Oral, Every 12 Hours Scheduled      aspirin 81 MG EC tablet   81 mg, Oral, Daily      Diclofenac Sodium 1 % gel gel  Commonly known as: VOLTAREN   4 g, Topical, 2 times daily, Apply to lumbar area twice daily       dilTIAZem  MG 24 hr capsule  Commonly known as: CARDIZEM CD   300 mg, Oral, Daily      fenofibrate 145 MG tablet  Commonly known as: TRICOR   145 mg, Oral, Nightly      flecainide 100 MG tablet  Commonly known as: TAMBOCOR   TAKE 1 TABLET IN THE MORNING AND ONE-HALF (1/2) TABLET AT NIGHT AS DIRECTED      furosemide 20 MG tablet  Commonly known as: LASIX   20 mg, Oral, Daily      gabapentin 100 MG capsule  Commonly known as: NEURONTIN   100 mg, Oral, 2 Times Daily      guaiFENesin 100 MG/5ML solution oral solution  Commonly known as: ROBITUSSIN   200 mg, Oral, Every 4 Hours PRN      magnesium oxide 400 (241.3 Mg) MG tablet tablet  Commonly known as: MAGOX   400 mg, Oral, Daily      melatonin 5 MG tablet tablet   5 mg, Oral, Nightly      methocarbamol 500 MG tablet  Commonly known as: ROBAXIN   500 mg, Oral, Every 8 Hours PRN      multivitamin tablet tablet  Commonly known as: THERAGRAN   1 tablet, Oral, Daily      polyethylene glycol 17 g packet  Commonly known as: MIRALAX   17 g, Oral, Daily      potassium chloride 10 MEQ CR capsule  Commonly known as: MICRO-K   10 mEq, Oral, Daily      tiotropium 18 MCG per inhalation capsule  Commonly known as: SPIRIVA   1 capsule, Inhalation, Daily - RT      valsartan 40 MG tablet  Commonly known as: DIOVAN   40 mg, Oral, Daily      Vitamin D3 50 MCG (2000 UT) tablet   1 tablet, Oral, Daily         Stop These Medications    amoxicillin 500 MG capsule  Commonly known as: AMOXIL            No Known Allergies      Discharge Disposition:  Home or Self Care    Diet:  Hospital:  Diet Order    Procedures   • Diet Regular       Activity:           CODE STATUS:    Code Status and Medical Interventions:   Ordered at: 07/13/21 1740     Level Of Support Discussed With:    Patient     Code Status:    CPR     Medical Interventions (Level of Support Prior to Arrest):    Full       No future appointments.    Additional Instructions for the Follow-ups that You Need to Schedule     Discharge Follow-up with PCP   As directed       Currently Documented PCP:    Bimal Garrett MD    PCP Phone Number:    [phone]     Follow Up Details: 1 week                     David Cotter MD  07/15/21      Time Spent on Discharge:  I spent  35  minutes on this discharge activity which included: face-to-face encounter with the patient, reviewing the data in the system, coordination of the care with the nursing staff as well as consultants, documentation, and entering orders.          Electronically signed by David Cotter MD at 07/15/21 0900

## 2021-07-15 NOTE — PLAN OF CARE
Goal Outcome Evaluation:    VSS. Patient requested PRN melatonin. Patient rested well overnight and did not verbalize any complaints. Will continue to monitor.       Problem: Adult Inpatient Plan of Care  Goal: Plan of Care Review  Outcome: Ongoing, Progressing  Goal: Patient-Specific Goal (Individualized)  Outcome: Ongoing, Progressing  Goal: Absence of Hospital-Acquired Illness or Injury  Outcome: Ongoing, Progressing  Intervention: Identify and Manage Fall Risk  Recent Flowsheet Documentation  Taken 7/15/2021 0200 by Wyatt Neff RN  Safety Promotion/Fall Prevention:   activity supervised   assistive device/personal items within reach   clutter free environment maintained   safety round/check completed   room organization consistent  Taken 7/15/2021 0000 by Wyatt Neff RN  Safety Promotion/Fall Prevention:   activity supervised   assistive device/personal items within reach   clutter free environment maintained   safety round/check completed   room organization consistent  Taken 7/14/2021 2200 by Wyatt Neff RN  Safety Promotion/Fall Prevention:   activity supervised   clutter free environment maintained   assistive device/personal items within reach   safety round/check completed   room organization consistent  Taken 7/14/2021 2000 by Wyatt Neff RN  Safety Promotion/Fall Prevention:   activity supervised   assistive device/personal items within reach   clutter free environment maintained   safety round/check completed   room organization consistent  Intervention: Prevent Skin Injury  Recent Flowsheet Documentation  Taken 7/15/2021 0200 by Wyatt Neff RN  Body Position: position changed independently  Skin Protection: adhesive use limited  Taken 7/15/2021 0000 by Wyatt Neff RN  Body Position: position changed independently  Skin Protection: adhesive use limited  Taken 7/14/2021 2200 by Wyatt Neff RN  Body Position: position changed independently  Skin Protection: adhesive use  limited  Taken 7/14/2021 2000 by Wyatt Neff RN  Body Position: position changed independently  Skin Protection: adhesive use limited  Intervention: Prevent and Manage VTE (venous thromboembolism) Risk  Recent Flowsheet Documentation  Taken 7/14/2021 2000 by Wyatt Neff RN  VTE Prevention/Management: bleeding risk factor(s) identified  Goal: Optimal Comfort and Wellbeing  Outcome: Ongoing, Progressing  Intervention: Provide Person-Centered Care  Recent Flowsheet Documentation  Taken 7/14/2021 2000 by Wyatt Neff RN  Trust Relationship/Rapport: care explained  Goal: Readiness for Transition of Care  Outcome: Ongoing, Progressing     Problem: Fall Injury Risk  Goal: Absence of Fall and Fall-Related Injury  Outcome: Ongoing, Progressing  Intervention: Promote Injury-Free Environment  Recent Flowsheet Documentation  Taken 7/15/2021 0200 by Wyatt Neff RN  Safety Promotion/Fall Prevention:   activity supervised   assistive device/personal items within reach   clutter free environment maintained   safety round/check completed   room organization consistent  Taken 7/15/2021 0000 by Wyatt Neff RN  Safety Promotion/Fall Prevention:   activity supervised   assistive device/personal items within reach   clutter free environment maintained   safety round/check completed   room organization consistent  Taken 7/14/2021 2200 by Wyatt Neff RN  Safety Promotion/Fall Prevention:   activity supervised   clutter free environment maintained   assistive device/personal items within reach   safety round/check completed   room organization consistent  Taken 7/14/2021 2000 by Wyatt Nfef RN  Safety Promotion/Fall Prevention:   activity supervised   assistive device/personal items within reach   clutter free environment maintained   safety round/check completed   room organization consistent     Problem: Fluid Imbalance (Pneumonia)  Goal: Fluid Balance  Outcome: Ongoing, Progressing     Problem: Infection  (Pneumonia)  Goal: Resolution of Infection Signs and Symptoms  Outcome: Ongoing, Progressing     Problem: Respiratory Compromise (Pneumonia)  Goal: Effective Oxygenation and Ventilation  Outcome: Ongoing, Progressing  Intervention: Optimize Oxygenation and Ventilation  Recent Flowsheet Documentation  Taken 7/15/2021 0200 by Wyatt Neff RN  Head of Bed (HOB): HOB flat  Taken 7/15/2021 0000 by Wyatt Neff RN  Head of Bed (HOB): HOB flat  Taken 7/14/2021 2200 by Wyatt Neff RN  Head of Bed (HOB): HOB elevated  Taken 7/14/2021 2000 by Wyatt Neff RN  Head of Bed (HOB): HOB elevated

## 2021-07-15 NOTE — PLAN OF CARE
Goal Outcome Evaluation:  Plan of Care Reviewed With: patient        Progress: improving  Outcome Summary: pt breathing much better today. VSS, SBP slightly elevated, given scheduled medications. pt to be discharged home today. daughter to transport.

## 2021-07-15 NOTE — THERAPY EVALUATION
Patient Name: Danay Connolly  : 1934    MRN: [id number]                              Today's Date: 7/15/2021       Admit Date: 2021    Visit Dx:     ICD-10-CM ICD-9-CM   1. Pneumonia of both lower lobes due to infectious organism  J18.9 486   2. Shortness of breath  R06.02 786.05   3. Failure of outpatient treatment  Z78.9 V49.89     Patient Active Problem List   Diagnosis   • Hypercholesterolemia   • PAF (paroxysmal atrial fibrillation) (CMS/HCC)   • Hypertension   • GERD (gastroesophageal reflux disease)   • First degree AV block   • Murmur, cardiac   • Non-rheumatic mitral regurgitation   • Current use of long term anticoagulation   • Long term current use of antiarrhythmic drug   • Pulmonary hypertension (CMS/HCC)   • Pneumonia of both lower lobes due to infectious organism   • COPD with acute exacerbation (CMS/HCC)     Past Medical History:   Diagnosis Date   • Arthritis    • Bladder prolapse, female, acquired    • Breast cyst     removed    • GERD (gastroesophageal reflux disease)    • H/O oral surgery    • H/O: hysterectomy    • Hx of colonoscopy with polypectomy    • Hypercholesterolemia    • Hyperlipidemia    • Hypertension    • Osteoporosis    • Seasonal allergies      Past Surgical History:   Procedure Laterality Date   • BREAST CYST ASPIRATION     • CARDIAC CATHETERIZATION  2018    40% Mid LAD, Mod- Sig MR. PA- 50/21 mmHg.   • CARDIOVASCULAR STRESS TEST  2011    Stress- 3 min, >100% THR. BP- 180/76. Negative   • CARDIOVASCULAR STRESS TEST  2017    L. Cardiolite- Negative   • CARDIOVASCULAR STRESS TEST  2018    L. Cardiolite- Breast attenuation.   • COLONOSCOPY      wilth polypectomy   • CONVERTED (HISTORICAL) HOLTER  2011     Holter- (Dr. Garrett)  AVG HR 73 BPM. Mul Artifacts. Few PAC's   • CONVERTED (HISTORICAL) HOLTER  2014    Holter- (Dr. Garrett) A-Fib at AVG  BPM.   • CONVERTED (HISTORICAL) HOLTER  2018    AVG  BPM. Freq PVC's 12.5%. Mul  PAF   • ECHO - CONVERTED  08/27/1997    Echo- EF 50%. R/O Biscupid AV   • ECHO - CONVERTED  07/2011    Echo- EF 65%. Anterior WMA. RVSP- 53mmHg   • ECHO - CONVERTED  02/06/2017    EF 65%, no AS, mild MR, RSVP 40 mmHg   • ECHO - CONVERTED  05/03/2018    EF 60%. Mod-sev MR. LA.4.8. RVSP-45 mmHg   • ECHO - CONVERTED  11/26/2018    EF 60%. Mod MR. RVSP- 50 mmHg.   • ECHO - CONVERTED  03/08/2019    [facility]- TLS, LVEF 60%, moderate MR, moderate TR, RVSP 45 mmHg.    • ECHO - CONVERTED  08/28/2020    EF 60%. LA- 5.1 Cm. Mild MR. RVSP- 54 mmHg.   • HYSTERECTOMY     • MOUTH SURGERY     • PELVIC FLOOR REPAIR     • STEROID INJECTION KNEE Left    • COCO  05/14/2014    COCO and Cardioversion- No thrombus, converted to sinus with 200 J.     General Information     Row Name 07/15/21 1056 07/15/21 0838       Physical Therapy Time and Intention    Document Type  evaluation  -MB  evaluation  -MB    Mode of Treatment  --  physical therapy  -MB    Row Name 07/15/21 0838          General Information    Patient Profile Reviewed  yes  -MB     Prior Level of Function  independent:;all household mobility;bed mobility;transfer;gait;ADL's;community mobility  -MB     Existing Precautions/Restrictions  no known precautions/restrictions  -MB     Barriers to Rehab  previous functional deficit  -MB     Row Name 07/15/21 0838          Living Environment    Lives With  facility resident ILF  -MB     Row Name 07/15/21 0838          Home Main Entrance    Number of Stairs, Main Entrance  none  -MB     Row Name 07/15/21 0838          Stairs Within Home, Primary    Number of Stairs, Within Home, Primary  none  -MB     Row Name 07/15/21 0838          Cognition    Orientation Status (Cognition)  oriented x 4  -MB     Row Name 07/15/21 0838          Safety Issues, Functional Mobility    Safety Issues Affecting Function (Mobility)  insight into deficits/self-awareness;safety precaution awareness  -MB     Impairments Affecting Function (Mobility)   endurance/activity tolerance  -MB       User Key  (r) = Recorded By, (t) = Taken By, (c) = Cosigned By    Initials Name Provider Type    Katey Agarwal, PT Physical Therapist        Mobility     Row Name 07/15/21 0838          Bed Mobility    Comment (Bed Mobility)  UIC  -MB     Row Name 07/15/21 0838          Transfers    Comment (Transfers)  Pt. demo safe and appropriate transfer technique w/ no LOB.  -MB     Row Name 07/15/21 0838          Sit-Stand Transfer    Sit-Stand [place] (Transfers)  independent  -MB     Assistive Device (Sit-Stand Transfers)  other (see comments) no AD  -MB     Row Name 07/15/21 0838          Gait/Stairs (Locomotion)    [place] Level (Gait)  verbal cues;supervision  -MB     Assistive Device (Gait)  walker, 4-wheeled  -MB     Distance in Feet (Gait)  165  -MB     Deviations/Abnormal Patterns (Gait)  tc decreased;gait speed decreased;stride length decreased  -MB     Bilateral Gait Deviations  forward flexed posture  -MB     Right Sided Gait Deviations  weight shift ability decreased w/ fatigue  -MB     Comment (Gait/Stairs)  Pt. ambulated w/ step through gait pattern w/ slower tc.  VCs for upright posture.  Gait distance limited by fatigue.  -MB       User Key  (r) = Recorded By, (t) = Taken By, (c) = Cosigned By    Initials Name Provider Type    Katey Agarwal, PT Physical Therapist        Obj/Interventions     Row Name 07/15/21 0838          Range of Motion Comprehensive    General Range of Motion  no range of motion deficits identified  -MB     Row Name 07/15/21 0838          Strength Comprehensive (MMT)    General Manual Muscle Testing (MMT) Assessment  lower extremity strength deficits identified  -MB     Comment, General Manual Muscle Testing (MMT) Assessment  BLEs and BUEs functionally 4/5  -MB     Row Name 07/15/21 0838          Motor Skills    Motor Skills  functional endurance;therapeutic exercise  -MB     Therapeutic Exercise   hip;knee;shoulder;ankle  -MB     Row Name 07/15/21 0838          Shoulder (Therapeutic Exercise)    Shoulder (Therapeutic Exercise)  AROM (active range of motion)  -MB     Shoulder AROM (Therapeutic Exercise)  bilateral;flexion  -MB     Row Name 07/15/21 0838          Hip (Therapeutic Exercise)    Hip (Therapeutic Exercise)  strengthening exercise  -MB     Hip Strengthening (Therapeutic Exercise)  bilateral;marching while seated  -MB     Row Name 07/15/21 0838          Knee (Therapeutic Exercise)    Knee (Therapeutic Exercise)  strengthening exercise  -MB     Knee Strengthening (Therapeutic Exercise)  bilateral;LAQ (long arc quad)  -MB     Row Name 07/15/21 0838          Ankle (Therapeutic Exercise)    Ankle (Therapeutic Exercise)  strengthening exercise  -MB     Ankle Strengthening (Therapeutic Exercise)  bilateral;dorsiflexion;plantarflexion  -MB     Row Name 07/15/21 0838          Balance    Balance Assessment  sitting static balance;sitting dynamic balance;standing static balance;standing dynamic balance  -MB     Static Sitting Balance  WNL  -MB     Dynamic Sitting Balance  WNL  -MB     Static Standing Balance  WFL;supported  -MB     Dynamic Standing Balance  WFL;supported  -MB     Balance Interventions  standing;sit to stand;dynamic reaching;weight shifting activity  -MB     Row Name 07/15/21 0838          Sensory Assessment (Somatosensory)    Sensory Assessment (Somatosensory)  sensation intact  -MB       User Key  (r) = Recorded By, (t) = Taken By, (c) = Cosigned By    Initials Name Provider Type    MB Katey Lamas, PT Physical Therapist        Goals/Plan     Row Name 07/15/21 0838          Bed Mobility Goal 1 (PT)    Activity/Assistive Device (Bed Mobility Goal 1, PT)  bed mobility activities, all  -MB     [place] Level/Cues Needed (Bed Mobility Goal 1, PT)  independent  -MB     Time Frame (Bed Mobility Goal 1, PT)  1 week  -MB     Progress/Outcomes (Bed Mobility Goal 1, PT)  goal ongoing  -MB      Row Name 07/15/21 0838          Gait Training Goal 1 (PT)    Activity/Assistive Device (Gait Training Goal 1, PT)  gait (walking locomotion);walker, rolling  -MB     [place] Level (Gait Training Goal 1, PT)  modified independence  -MB     Distance (Gait Training Goal 1, PT)  300  -MB     Time Frame (Gait Training Goal 1, PT)  1 week  -MB     Progress/Outcome (Gait Training Goal 1, PT)  goal ongoing  -MB     Row Name 07/15/21 0838          Patient Education Goal (PT)    Activity (Patient Education Goal, PT)  HEP  -MB     [place]/Cues/Accuracy (Memory Goal 2, PT)  demonstrates adequately  -MB     Time Frame (Patient Education Goal, PT)  1 week  -MB       User Key  (r) = Recorded By, (t) = Taken By, (c) = Cosigned By    Initials Name Provider Type    Katey Agarwal, PT Physical Therapist        Clinical Impression     Row Name 07/15/21 0838          Pain    Additional Documentation  Pain Scale: Numbers Pre/Post-Treatment (Group);Pain Scale: FACES Pre/Post-Treatment (Group)  -MB     Row Name 07/15/21 0838          Pain Scale: Numbers Pre/Post-Treatment    Pretreatment Pain Rating  0/10 - no pain  -MB     Posttreatment Pain Rating  2/10  -MB     Pain Location - Orientation  lower  -MB     Pain Location  back  -MB     Pre/Posttreatment Pain Comment  Pt. c/o increased low back pain w/ prolonged ambulation.  -MB     Pain Intervention(s)  Ambulation/increased activity;Repositioned  -MB     Row Name 07/15/21 0838          Pain Scale: FACES Pre/Post-Treatment    Pain: FACES Scale, Pretreatment  0-->no hurt  -MB     Posttreatment Pain Rating  2-->hurts little bit  -MB     Row Name 07/15/21 0838          Plan of Care Review    Plan of Care Reviewed With  patient  -MB     Progress  improving  -MB     Outcome Summary  PT eval completed.  Patient presents w/ generalized weakness and decreased functional endurance.  She completed STS transfers independently and ambulated 165ft w/ rollator and supervision.  Gait  "distance limited by fatigue and increased low back pain.  Anticipate pt. to return to [place] later today.  Recommend HHPT.  -MB     Row Name 07/15/21 0838          Therapy Assessment/Plan (PT)    Patient/Family Therapy Goals Statement (PT)  \"I want to get better.\"  -MB     Rehab Potential (PT)  good, to achieve stated therapy goals  -MB     Criteria for Skilled Interventions Met (PT)  yes;meets criteria;skilled treatment is necessary  -MB     Row Name 07/15/21 0838          Vital Signs    Pre Systolic BP Rehab  -- VSS.  RN cleared for PT.  -MB     Pre Patient Position  Sitting  -MB     Intra Patient Position  Standing  -MB     Post Patient Position  Sitting  -MB     Row Name 07/15/21 0838          Positioning and Restraints    Pre-Treatment Position  sitting in chair/recliner  -MB     Post Treatment Position  chair  -MB     In Chair  notified nsg;reclined;legs elevated;call light within reach;encouraged to call for assist  -MB       User Key  (r) = Recorded By, (t) = Taken By, (c) = Cosigned By    Initials Name Provider Type    Katey Agarwal, PT Physical Therapist        Outcome Measures     Row Name 07/15/21 0835 07/15/21 0800       How much help from another person do you currently need...    Turning from your back to your side while in flat bed without using bedrails?  4  -MB  4  -ST    Moving from lying on back to sitting on the side of a flat bed without bedrails?  4  -MB  4  -ST    Moving to and from a bed to a chair (including a wheelchair)?  4  -MB  3  -ST    Standing up from a chair using your arms (e.g., wheelchair, bedside chair)?  4  -MB  3  -ST    Climbing 3-5 steps with a railing?  3  -MB  2  -ST    To walk in hospital room?  3  -MB  3  -ST    AM-PAC 6 Clicks Score (PT)  22  -MB  19  -ST    Row Name 07/15/21 0835          Functional Assessment    Outcome Measure Options  AM-PAC 6 Clicks Basic Mobility (PT)  -MB       User Key  (r) = Recorded By, (t) = Taken By, (c) = Cosigned By    Initials Name " Provider Type    Katey Agarwal, PT Physical Therapist    Leslie Bermudez, RN Registered Nurse        Physical Therapy Education                 Title: PT OT SLP Therapies (Done)     Topic: Physical Therapy (Done)     Point: Mobility training (Done)     Learning Progress Summary           Patient Acceptance, E,D, VU by MB at 7/15/2021 1108                   Point: Home exercise program (Done)     Learning Progress Summary           Patient Acceptance, E,D, VU by MB at 7/15/2021 1108                   Point: Body mechanics (Done)     Learning Progress Summary           Patient Acceptance, E,D, VU by MB at 7/15/2021 1108                   Point: Precautions (Done)     Learning Progress Summary           Patient Acceptance, E,D, VU by MB at 7/15/2021 1108                               User Key     Initials Effective Dates Name Provider Type [facility]    MB 06/16/21 -  Katey Lamas, PT Physical Therapist PT              PT Recommendation and Plan  Planned Therapy Interventions (PT): balance training, bed mobility training, gait training, home exercise program, patient/family education, strengthening, transfer training  Plan of Care Reviewed With: patient  Progress: improving  Outcome Summary: PT eval completed.  Patient presents w/ generalized weakness and decreased functional endurance.  She completed STS transfers independently and ambulated 165ft w/ rollator and supervision.  Gait distance limited by fatigue and increased low back pain.  Anticipate pt. to return [facility] later today.  Recommend HHPT.     Time Calculation:   PT Charges     Row Name 07/15/21 1114             Time Calculation    Start Time  0838  -MB      PT Received On  07/15/21  -MB      PT Goal Re-Cert Due Date  07/25/21  -MB        User Key  (r) = Recorded By, (t) = Taken By, (c) = Cosigned By    Initials Name Provider Type    Katey Agarwal, PT Physical Therapist        Therapy Charges for Today     Code Description  Service Date Service Provider Modifiers Qty    [id number] HC PT EVAL LOW COMPLEXITY 4 7/15/2021 Katey Lamas, PT GP 1          PT G-Codes  Outcome Measure Options: AM-PAC 6 Clicks Basic Mobility (PT)  AM-PAC 6 Clicks Score (PT): 22    Katey Lamas, PT  7/15/2021

## 2021-07-17 LAB
QT INTERVAL: 404 MS
QTC INTERVAL: 445 MS

## 2021-07-18 LAB
BACTERIA SPEC AEROBE CULT: NORMAL
BACTERIA SPEC AEROBE CULT: NORMAL

## 2023-08-07 NOTE — PROGRESS NOTES
Procedure     ECG 12 Lead  Date/Time: 7/31/2018 11:18 AM  Performed by: GERHARD CANALES  Authorized by: GERHARD CANALES   Comparison: compared with previous ECG from 7/26/2018  Comparison to previous ECG: Sinus tach, 105 bpm  Rhythm: sinus rhythm  Ectopy: PVCs  BPM: 99  Comments: Normal QTc noted              Lot # (Optional): [id number] Lot # (Optional): [id number]

## 2023-11-20 ENCOUNTER — TELEPHONE (OUTPATIENT)
Dept: CARDIOLOGY | Facility: CLINIC | Age: 88
End: 2023-11-20
Payer: MEDICARE

## 2023-11-20 NOTE — TELEPHONE ENCOUNTER
Nurse with Dr. Garrett's office called to check on patient's flecainide dosing. I made her aware that we have not seen patient since 07/20/2020. I made her aware of last dosing instructions we had, but made her aware that since it has been so long that may have changed by another provider. She states that she will find out if patient has been seen elsewhere.